# Patient Record
Sex: MALE | Race: WHITE
[De-identification: names, ages, dates, MRNs, and addresses within clinical notes are randomized per-mention and may not be internally consistent; named-entity substitution may affect disease eponyms.]

---

## 2017-01-05 ENCOUNTER — HOSPITAL ENCOUNTER (OUTPATIENT)
Dept: HOSPITAL 45 - C.LAB1850 | Age: 47
Discharge: HOME | End: 2017-01-05
Attending: INTERNAL MEDICINE
Payer: COMMERCIAL

## 2017-01-05 DIAGNOSIS — M31.0: Primary | ICD-10-CM

## 2017-01-05 LAB
ALBUMIN/GLOB SERPL: 1 {RATIO} (ref 0.9–2)
ALP SERPL-CCNC: 95 U/L (ref 45–117)
ALT SERPL-CCNC: 55 U/L (ref 12–78)
ANION GAP SERPL CALC-SCNC: 8 MMOL/L (ref 3–11)
AST SERPL-CCNC: 52 U/L (ref 15–37)
BASOPHILS # BLD: 0.02 K/UL (ref 0–0.2)
BASOPHILS NFR BLD: 0.3 %
BUN SERPL-MCNC: 22 MG/DL (ref 7–18)
BUN/CREAT SERPL: 23.1 (ref 10–20)
CALCIUM SERPL-MCNC: 8.8 MG/DL (ref 8.5–10.1)
CHLORIDE SERPL-SCNC: 108 MMOL/L (ref 98–107)
CO2 SERPL-SCNC: 28 MMOL/L (ref 21–32)
COMPLETE: YES
CREAT SERPL-MCNC: 0.96 MG/DL (ref 0.6–1.4)
EOSINOPHIL NFR BLD AUTO: 248 K/UL (ref 130–400)
GLOBULIN SER-MCNC: 3.4 GM/DL (ref 2.5–4)
GLUCOSE SERPL-MCNC: 89 MG/DL (ref 70–99)
HCT VFR BLD CALC: 42.7 % (ref 42–52)
IG%: 0.1 %
IMM GRANULOCYTES NFR BLD AUTO: 30.4 %
LYMPHOCYTES # BLD: 2.34 K/UL (ref 1.2–3.4)
MCH RBC QN AUTO: 29.4 PG (ref 25–34)
MCHC RBC AUTO-ENTMCNC: 33.3 G/DL (ref 32–36)
MCV RBC AUTO: 88.4 FL (ref 80–100)
MONOCYTES NFR BLD: 6.2 %
NEUTROPHILS # BLD AUTO: 2.5 %
NEUTROPHILS NFR BLD AUTO: 60.5 %
PMV BLD AUTO: 10.5 FL (ref 7.4–10.4)
POTASSIUM SERPL-SCNC: 4.2 MMOL/L (ref 3.5–5.1)
RBC # BLD AUTO: 4.83 M/UL (ref 4.7–6.1)
SODIUM SERPL-SCNC: 144 MMOL/L (ref 136–145)
WBC # BLD AUTO: 7.71 K/UL (ref 4.8–10.8)

## 2017-04-03 ENCOUNTER — HOSPITAL ENCOUNTER (OUTPATIENT)
Dept: HOSPITAL 45 - C.LAB | Age: 47
Discharge: HOME | End: 2017-04-03
Attending: INTERNAL MEDICINE
Payer: COMMERCIAL

## 2017-04-03 DIAGNOSIS — M31.0: Primary | ICD-10-CM

## 2017-04-03 LAB
ALT SERPL-CCNC: 36 U/L (ref 12–78)
AST SERPL-CCNC: 22 U/L (ref 15–37)

## 2017-08-07 ENCOUNTER — HOSPITAL ENCOUNTER (INPATIENT)
Dept: HOSPITAL 45 - C.EDB | Age: 47
LOS: 3 days | Discharge: HOME | DRG: 274 | End: 2017-08-10
Attending: HOSPITALIST | Admitting: HOSPITALIST
Payer: COMMERCIAL

## 2017-08-07 ENCOUNTER — HOSPITAL ENCOUNTER (OUTPATIENT)
Dept: HOSPITAL 45 - C.LAB1850 | Age: 47
Discharge: HOME | End: 2017-08-07
Attending: INTERNAL MEDICINE
Payer: COMMERCIAL

## 2017-08-07 VITALS — DIASTOLIC BLOOD PRESSURE: 72 MMHG | SYSTOLIC BLOOD PRESSURE: 128 MMHG | HEART RATE: 150 BPM

## 2017-08-07 VITALS — HEART RATE: 120 BPM | DIASTOLIC BLOOD PRESSURE: 81 MMHG | SYSTOLIC BLOOD PRESSURE: 126 MMHG

## 2017-08-07 VITALS
WEIGHT: 251.11 LBS | BODY MASS INDEX: 35.15 KG/M2 | HEIGHT: 71 IN | BODY MASS INDEX: 35.15 KG/M2 | WEIGHT: 251.11 LBS | HEIGHT: 71 IN

## 2017-08-07 VITALS — HEART RATE: 95 BPM | SYSTOLIC BLOOD PRESSURE: 92 MMHG | DIASTOLIC BLOOD PRESSURE: 55 MMHG

## 2017-08-07 VITALS
TEMPERATURE: 98.24 F | OXYGEN SATURATION: 98 % | DIASTOLIC BLOOD PRESSURE: 78 MMHG | SYSTOLIC BLOOD PRESSURE: 137 MMHG | HEART RATE: 76 BPM

## 2017-08-07 DIAGNOSIS — F41.9: ICD-10-CM

## 2017-08-07 DIAGNOSIS — Z79.82: ICD-10-CM

## 2017-08-07 DIAGNOSIS — E66.9: ICD-10-CM

## 2017-08-07 DIAGNOSIS — I10: ICD-10-CM

## 2017-08-07 DIAGNOSIS — M10.9: ICD-10-CM

## 2017-08-07 DIAGNOSIS — Z79.01: ICD-10-CM

## 2017-08-07 DIAGNOSIS — Z95.2: ICD-10-CM

## 2017-08-07 DIAGNOSIS — I47.1: Primary | ICD-10-CM

## 2017-08-07 DIAGNOSIS — Z87.891: ICD-10-CM

## 2017-08-07 DIAGNOSIS — E78.5: ICD-10-CM

## 2017-08-07 DIAGNOSIS — G25.81: ICD-10-CM

## 2017-08-07 DIAGNOSIS — F32.9: ICD-10-CM

## 2017-08-07 DIAGNOSIS — M31.0: Primary | ICD-10-CM

## 2017-08-07 DIAGNOSIS — Z81.8: ICD-10-CM

## 2017-08-07 DIAGNOSIS — R21: ICD-10-CM

## 2017-08-07 DIAGNOSIS — Z79.899: ICD-10-CM

## 2017-08-07 DIAGNOSIS — G47.33: ICD-10-CM

## 2017-08-07 DIAGNOSIS — Z82.49: ICD-10-CM

## 2017-08-07 LAB
ALBUMIN/GLOB SERPL: 1.1 {RATIO} (ref 0.9–2)
ALBUMIN/GLOB SERPL: 1.1 {RATIO} (ref 0.9–2)
ALP SERPL-CCNC: 108 U/L (ref 45–117)
ALP SERPL-CCNC: 96 U/L (ref 45–117)
ALT SERPL-CCNC: 34 U/L (ref 12–78)
ALT SERPL-CCNC: 34 U/L (ref 12–78)
ANION GAP SERPL CALC-SCNC: 10 MMOL/L (ref 3–11)
ANION GAP SERPL CALC-SCNC: 8 MMOL/L (ref 3–11)
APPEARANCE UR: CLEAR
AST SERPL-CCNC: 25 U/L (ref 15–37)
AST SERPL-CCNC: 26 U/L (ref 15–37)
BASOPHILS # BLD: 0.01 K/UL (ref 0–0.2)
BASOPHILS # BLD: 0.02 K/UL (ref 0–0.2)
BASOPHILS NFR BLD: 0.1 %
BASOPHILS NFR BLD: 0.2 %
BENZODIAZ UR-MCNC: (no result) UG/L
BILIRUB UR-MCNC: (no result) MG/DL
BUN SERPL-MCNC: 18 MG/DL (ref 7–18)
BUN SERPL-MCNC: 19 MG/DL (ref 7–18)
BUN/CREAT SERPL: 16 (ref 10–20)
BUN/CREAT SERPL: 16.1 (ref 10–20)
CALCIUM SERPL-MCNC: 8.9 MG/DL (ref 8.5–10.1)
CALCIUM SERPL-MCNC: 9.1 MG/DL (ref 8.5–10.1)
CHLORIDE SERPL-SCNC: 107 MMOL/L (ref 98–107)
CHLORIDE SERPL-SCNC: 108 MMOL/L (ref 98–107)
CHOLEST/HDLC SERPL: 4.5 {RATIO}
CKMB/CK RATIO: 1.5 (ref 0–3)
CKMB/CK RATIO: 1.8 (ref 0–3)
CO2 SERPL-SCNC: 25 MMOL/L (ref 21–32)
CO2 SERPL-SCNC: 25 MMOL/L (ref 21–32)
COLOR UR: YELLOW
COMPLETE: YES
COMPLETE: YES
CREAT CL PREDICTED SERPL C-G-VRATE: 109.3 ML/MIN
CREAT SERPL-MCNC: 1.1 MG/DL (ref 0.6–1.4)
CREAT SERPL-MCNC: 1.2 MG/DL (ref 0.6–1.4)
EOSINOPHIL NFR BLD AUTO: 251 K/UL (ref 130–400)
EOSINOPHIL NFR BLD AUTO: 270 K/UL (ref 130–400)
GLOBULIN SER-MCNC: 3.5 GM/DL (ref 2.5–4)
GLOBULIN SER-MCNC: 3.6 GM/DL (ref 2.5–4)
GLUCOSE SERPL-MCNC: 108 MG/DL (ref 70–99)
GLUCOSE SERPL-MCNC: 86 MG/DL (ref 70–99)
GLUCOSE UR QL: 42 MG/DL
HCT VFR BLD CALC: 46 % (ref 42–52)
HCT VFR BLD CALC: 46.3 % (ref 42–52)
IG%: 0.1 %
IG%: 0.2 %
IMM GRANULOCYTES NFR BLD AUTO: 27.3 %
IMM GRANULOCYTES NFR BLD AUTO: 27.6 %
INR PPP: 2.1 (ref 0.9–1.1)
KETONES UR QL STRIP: 90 MG/DL
LYME DISEASE AB IGG: (no result)
LYME DISEASE AB IGM: (no result)
LYMPHOCYTES # BLD: 2.47 K/UL (ref 1.2–3.4)
LYMPHOCYTES # BLD: 2.49 K/UL (ref 1.2–3.4)
MAGNESIUM SERPL-MCNC: 2.1 MG/DL (ref 1.8–2.4)
MANUAL MICROSCOPIC REQUIRED?: NO
MCH RBC QN AUTO: 30.1 PG (ref 25–34)
MCH RBC QN AUTO: 30.5 PG (ref 25–34)
MCHC RBC AUTO-ENTMCNC: 34.1 G/DL (ref 32–36)
MCHC RBC AUTO-ENTMCNC: 34.6 G/DL (ref 32–36)
MCV RBC AUTO: 88.2 FL (ref 80–100)
MCV RBC AUTO: 88.3 FL (ref 80–100)
MONOCYTES NFR BLD: 7.2 %
MONOCYTES NFR BLD: 7.3 %
NEUTROPHILS # BLD AUTO: 1.3 %
NEUTROPHILS # BLD AUTO: 1.5 %
NEUTROPHILS NFR BLD AUTO: 63.3 %
NEUTROPHILS NFR BLD AUTO: 63.9 %
NITRITE UR QL STRIP: (no result)
NITRITE UR QL STRIP: 276 MG/DL (ref 0–150)
PARTIAL THROMBOPLASTIN RATIO: 1.4
PCP UR-MCNC: (no result) UG/L
PH UR STRIP: 5 [PH] (ref 4.5–7.5)
PH UR: 187 MG/DL (ref 0–200)
PMV BLD AUTO: 10.7 FL (ref 7.4–10.4)
PMV BLD AUTO: 10.7 FL (ref 7.4–10.4)
POTASSIUM SERPL-SCNC: 3.9 MMOL/L (ref 3.5–5.1)
POTASSIUM SERPL-SCNC: 4.1 MMOL/L (ref 3.5–5.1)
PROTHROMBIN TIME: 23 SECONDS (ref 9–12)
RBC # BLD AUTO: 5.21 M/UL (ref 4.7–6.1)
RBC # BLD AUTO: 5.25 M/UL (ref 4.7–6.1)
REVIEW REQ?: NO
SODIUM SERPL-SCNC: 141 MMOL/L (ref 136–145)
SODIUM SERPL-SCNC: 142 MMOL/L (ref 136–145)
SP GR UR STRIP: 1.02 (ref 1–1.03)
TSH SERPL-ACNC: 1.79 UIU/ML (ref 0.3–4.5)
URINE BILL WITH OR WITHOUT MIC: (no result)
URINE EPITHELIAL CELL AUTO: (no result) /LPF (ref 0–5)
UROBILINOGEN UR-MCNC: (no result) MG/DL
VERY LOW DENSITY LIPOPROT CALC: 55 MG/DL
WBC # BLD AUTO: 8.95 K/UL (ref 4.8–10.8)
WBC # BLD AUTO: 9.12 K/UL (ref 4.8–10.8)
ZZUR CULT IF INDIC CLEAN CATCH: NO

## 2017-08-07 RX ADMIN — BUPROPION HYDROCHLORIDE SCH MG: 150 TABLET, EXTENDED RELEASE ORAL at 20:56

## 2017-08-07 RX ADMIN — Medication SCH MG: at 21:51

## 2017-08-07 RX ADMIN — DILTIAZEM HYDROCHLORIDE PRN MLS/HR: 5 INJECTION INTRAVENOUS at 21:02

## 2017-08-07 RX ADMIN — ESCITALOPRAM OXALATE SCH MG: 20 TABLET, FILM COATED ORAL at 20:55

## 2017-08-07 RX ADMIN — SIMVASTATIN SCH MG: 40 TABLET, FILM COATED ORAL at 20:56

## 2017-08-07 RX ADMIN — ROPINIROLE HYDROCHLORIDE SCH MG: 0.25 TABLET, FILM COATED ORAL at 20:58

## 2017-08-07 RX ADMIN — VANCOMYCIN HYDROCHLORIDE SCH MG: 1 INJECTION, POWDER, LYOPHILIZED, FOR SOLUTION INTRAVENOUS at 20:52

## 2017-08-07 RX ADMIN — DILTIAZEM HYDROCHLORIDE PRN MLS/HR: 5 INJECTION INTRAVENOUS at 19:11

## 2017-08-07 RX ADMIN — Medication SCH ML: at 20:53

## 2017-08-07 NOTE — HISTORY AND PHYSICAL
History & Physical


Date & Time of Service:


Aug 7, 2017 at 18:18


Chief Complaint:


Heart Fluttering/Skipping/Racing/Headache/Pulse


Primary Care Physician:


Augusto Claire M.D.


History of Present Illness


Source:  patient, clinic records, hospital records


This is a 48 y/o male with a history of mechanical AVR in March 2012, HTN, HLD, 

gout, depression and anxiety, RLS, CELINE, and h/o recurrent C. diff who presented 

to the ED on 8/7 with palpitations, dizziness and shortness of breath.  The 

patient states he has a history of arrhythmia with similar symptoms since high 

school.  The arrhythmia had seemed to resolve in college and did not recur 

again until a few years ago following his AVR.  The patient states if he 

develops palpitations, it is usually very short lived.  A few weeks ago the 

patient had developed palpitations and had sustained tachycardia for a few 

hours before eventually resolving.  Yesterday, the patient again became 

tachycardic with his HR consistently in the 150s and associated palpitations, 

shortness of breath and dizziness.  This time his symptoms have persisted for 

over a day, prompting him to come to the ER as this does not usually last so 

long.  The patient denies fevers, chills, sweats, chest pain, claudication, 

cough, wheezing, nausea, vomiting, abdominal pain, dysuria, hematuria, urinary 

retention, paralysis, weakness, numbness and tingling.





Past Medical/Surgical History


Medical Problems:


(1) C. difficile colitis


Status: Recurrent, on chronic vancomycin





Surgical Problems:


(1) H/O aortic valve replacement


Status: Resolved  





HTN





HLD





Gout





Depression with anxiety





RLS





CELINE








Family History





Bipolar disorder


Coronary artery disease


Myocardial infarction


Schizophrenia





Social History


Smoking Status:  Former Smoker (quit 1 year ago)


Smokeless Tobacco Use:  No


Alcohol Use:  socially (beer on weekends)


Drug Use:  none


Marital Status:  in relationship


Housing status:  lives with significant other (lives with partner)


Occupational Status:  employed





Immunizations


History of Influenza Vaccine:  Yes


Influenza Vaccine Date:  Oct 6, 2011


History of Tetanus Vaccine?:  Unknown


History of Pneumococcal:  No


History of Hepatitis B Vaccine:  Unknown





Allergies


Coded Allergies:  


     No Known Allergies (Unverified , 8/7/17)





Home Medications


Scheduled


Amoxicillin (Amoxil), 2,000 MG PO UD


Aspirin (Aspirin Ec), 81 MG PO QAM


Bupropion (Wellbutrin Sr), 150 MG PO BID


Escitalopram Oxalate (Lexapro), 20 MG PO HS


Febuxostat (Uloric), 40 MG PO QAM


Metoprolol Tartrate (Lopressor) (Lopressor), 150 MG PO QPM


Multiple Vitamin (Multivitamin), 1 TAB PO QAM


Probiotic Product (Align), 4 MG PO HS


Ropinirole (Requip), 0.5 MG PO HS


Simvastatin (Zocor), 40 MG PO QPM


Vancomycin Hcl (Vancomycin), 125 MG PO QPM


Warfarin Sod (Jantoven), 5 MG PO DAILY


Warfarin Sod (Jantoven), 2 MG PO DAILY


Zolpidem Tartrate (Ambien), 10 MG PO HS





Scheduled PRN


Lorazepam (Ativan *), 0.5 MG PO TID PRN





Review of Systems


Constitutional:  No fever, No chills, No sweats, No weakness, No fatigue


Eyes:  No worsening of vision, No discharge, No diplopia


ENT:  No hearing loss, No sore throat, No trouble swallowing


Respiratory:  + shortness of breath, No cough, No wheezing


Cardiovascular:  + palpitations, No chest pain, No claudication


Abdomen:  No pain, No nausea, No vomiting


Musculoskeletal:  No joint pain, No muscle pain, No swelling


Genitourinary - Male:  No hematuria, No dysuria, No urinary retention


Neurologic:  No paralysis, No weakness, No numbness/tingling


Integumentary:  No rash, No itch, No color change





Physical Exam


Vital Signs











  Date Time  Temp Pulse Resp B/P (MAP) Pulse Ox O2 Delivery O2 Flow Rate FiO2


 


8/7/17 17:21  132 19 123/91 96 Room Air  


 


8/7/17 16:44  116 32 134/80 97   


 


8/7/17 16:12  111 24 128/97 98 Room Air  


 


8/7/17 15:58  128 23 132/96 98 Room Air  


 


8/7/17 15:58  104  132/96    


 


8/7/17 15:32  136 17 112/96 98 Room Air  


 


8/7/17 15:31     98 Room Air  


 


8/7/17 15:21  134  154/118    


 


8/7/17 15:14  133      


 


8/7/17 14:48     96 Room Air  


 


8/7/17 14:45 36.7     Room Air  








General appearance:  +Obese.  Well-developed, well-nourished, no apparent 

distress


Head:  Normocephalic, atraumatic


Eyes:  Normal inspection, PERRL, EOMI


ENT:  Normal ENT inspection, hearing grossly normal, pharynx normal


Neck:  Supple, no JVD, trachea midline


Respiratory/Chest:  Lungs clear to auscultation, normal breath sounds, no 

respiratory distress


Cardiovascular:  +Tachycardic.  Regular rhythm, no gallop, no murmur


Abdomen/GI:  Normal bowel sounds, non-tender, soft


Extremities/Musculoskeletal:  Normal inspection, no calf tenderness, no pedal 

edema


Neurological/Psych:  Alert, normal mood/affect, oriented x 3


Skin:  Normal color, warm/dry, no rash





Diagnostics


Laboratory Results





Results Past 24 Hours








Test


  8/7/17


15:25 8/7/17


15:38 8/7/17


17:50 Range/Units


 


 


White Blood Count 9.12   4.8-10.8  K/uL


 


Red Blood Count 5.21   4.7-6.1  M/uL


 


Hemoglobin 15.9   14.0-18.0  g/dL


 


Hematocrit 46.0   42-52  %


 


Mean Corpuscular Volume 88.3     fL


 


Mean Corpuscular Hemoglobin 30.5   25-34  pg


 


Mean Corpuscular Hemoglobin


Concent 34.6


  


  


  32-36  g/dl


 


 


Platelet Count 251   130-400  K/uL


 


Mean Platelet Volume 10.7   7.4-10.4  fL


 


Neutrophils (%) (Auto) 63.9    %


 


Lymphocytes (%) (Auto) 27.3    %


 


Monocytes (%) (Auto) 7.2    %


 


Eosinophils (%) (Auto) 1.3    %


 


Basophils (%) (Auto) 0.2    %


 


Neutrophils # (Auto) 5.82   1.4-6.5  K/uL


 


Lymphocytes # (Auto) 2.49   1.2-3.4  K/uL


 


Monocytes # (Auto) 0.66   0.11-0.59  K/uL


 


Eosinophils # (Auto) 0.12   0-0.5  K/uL


 


Basophils # (Auto) 0.02   0-0.2  K/uL


 


RDW Standard Deviation 44.2   36.4-46.3  fL


 


RDW Coefficient of Variation 13.6   11.5-14.5  %


 


Immature Granulocyte % (Auto) 0.1    %


 


Immature Granulocyte # (Auto) 0.01   0.00-0.02  K/uL


 


Prothrombin Time


  23.0


  


  


  9.0-12.0


SECONDS


 


Prothromb Time International


Ratio 2.1


  


  


  0.9-1.1  


 


 


Activated Partial


Thromboplast Time 36.9


  


  


  21.0-31.0


SECONDS


 


Partial Thromboplastin Ratio 1.4    


 


Sodium Level 142   136-145  mmol/L


 


Potassium Level 3.9   3.5-5.1  mmol/L


 


Chloride Level 107     mmol/L


 


Carbon Dioxide Level 25   21-32  mmol/L


 


Anion Gap 10.0   3-11  mmol/L


 


Blood Urea Nitrogen 18   7-18  mg/dl


 


Creatinine


  1.10


  


  


  0.60-1.40


mg/dl


 


Est Creatinine Clear Calc


Drug Dose 109.3


  


  


   ml/min


 


 


Estimated GFR (


American) 93.5


  


  


   


 


 


Estimated GFR (Non-


American 80.6


  


  


   


 


 


BUN/Creatinine Ratio 16.1   10-20  


 


Random Glucose 86   70-99  mg/dl


 


Calcium Level 8.9   8.5-10.1  mg/dl


 


Magnesium Level 2.1   1.8-2.4  mg/dl


 


Total Bilirubin 1.0   0.2-1  mg/dl


 


Aspartate Amino Transf


(AST/SGOT) 26


  


  


  15-37  U/L


 


 


Alanine Aminotransferase


(ALT/SGPT) 34


  


  


  12-78  U/L


 


 


Alkaline Phosphatase 108     U/L


 


Total Creatine Kinase 94     U/L


 


Creatine Kinase MB 1.7   0.5-3.6  ng/ml


 


Creatine Kinase MB Ratio 1.8   0-3.0  


 


Total Protein 7.4   6.4-8.2  gm/dl


 


Albumin 3.8   3.4-5.0  gm/dl


 


Globulin 3.6   2.5-4.0  gm/dl


 


Albumin/Globulin Ratio 1.1   0.9-2  


 


Thyroid Stimulating Hormone


(TSH) 1.790


  


  


  0.300-4.500


uIu/ml


 


Lyme Disease IgG Antibody NEG   NEG  


 


Lyme Disease IgM Antibody NEG   NEG  


 


Bedside Troponin I  < 0.030  0-0.045  ng/ml











Diagnostic Radiology


Reviewed the following studies and agree with interpretation as follows:








                                                                               

                                                                 


Patient Name: BALDO ALMAZAN                               Unit Number:  

[id number]                  


 








 











Dictated: 08/07/17 1648


 


Transcribed: 08/07/17 1648


 


MS


 


Printed Date/Time: [~ rep prt dt]/[~ rep prt tm]








 [~ rep ct labl] - [~ rep ct ivnm]


 





   [facility]


 Radiology Department


 [place] [address]


 [phone]





 











Dictated: 08/07/17 1648


 


Transcribed: 08/07/17 1648


 


MS


 


Printed Date/Time: [~ rep prt dt]/[~ rep prt tm]








 [~ rep ct labl] - [~ rep ct ivnm]


 





 











Patient:  BALDO ALMAZAN Address1:  [address] Rec:  [id number] Address2:  


 


Acct ID:  [id number] [facility] Zip:  [place] [address]


 


YOB: 1970          Sex:  M Room/Bed:  


 


Ref Phy:  Augusto Claire M.D. SC: KELLI


 


Att Phy:   Report #:  [id number]


 


Rox Phy:  Augusto Claire M.D. Test:  CXR1P


 


Admit Phy:   Technician:  MARICEL


 


Interpreting Phy:  Loyd Torres M.D. Diagnosis:  HEART FLUTTERING/SKIPPING/

RACING/HEADACHE/PULSE


 


Ordering Phy:  Rj Rowe PA-C Service Date:  08/07/17


 


Admit Date: 08/07/17 MNE: [id number]


 


 CONF:


 


 DICTATED BY: Loyd Torres M.D.]]


 


CC: Rj Rowe PA-C Flickinger, Bridget B., M.D. Pro, Jeffrey W., M.D.


 


 Endcc:











[~ rep ct add3]]


CHEST ONE VIEW PORTABLE





CLINICAL HISTORY: Dyspnea, tachycardia. Mechanical heart valve    





COMPARISON STUDY:  3/6/2015





FINDINGS: Mild stable cardiomegaly. Prior median sternotomy. Lungs are clear.


Diaphragms are smooth. 





IMPRESSION:  Chronic and postoperative change. No acute process. 

















The above report was generated using voice recognition software.  It may contain


grammatical, syntax or spelling errors.














Electronically signed by:  Loyd Torres M.D.


8/7/2017 4:49 PM





Dictated Date/Time:  8/7/2017 4:48 PM





 The status of this report is Signed.   


 Draft = Not yet reviewed or approved by Radiologist.  


 Signed = Reviewed and approved by Radiologist.


<AttendingPhy></AttendingPhy> <FamilyPhy>Augusto Claire M.D.</FamilyPhy> <

PrimaryPhy>Pro,Augusto W., M.D.</PrimaryPhy> <UnitNumber>[id number]</UnitNumber

> <VisitNumber>[id number]</VisitNumber> <PatientName>BALDO ALMAZAN</

PatientName> <DateOfBirth>1970</DateOfBirth> <Location>[facility]</Location> <

ServiceDate>08/07/17</ServiceDate> <MNE>ESINDI</MNE> <OrderingPhy>Rj Rowe PA-C</OrderingPhy> <OrderingPhyMNE>f rep ord dr rizo</OrderingPhyMNE> <

DictatingPhyMNE>f rep dict dr rizo</DictatingPhyMNE> <CCListMNE>f rep ct mne</

CCListMNE> <AdmittingPhyMNE>f pt admit dr rizo</AdmittingPhyMNE> <AttendingPhyMNE

>f pt attend dr rizo</AttendingPhyMNE>


<ConsultingPhyMNE>f pt consult dr rizo</ConsultingPhyMNE> <FamilyPhyMNE>f pt fam 

dr rizo</FamilyPhyMNE> <OtherPhyMNE>f pt other dr rizo</OtherPhyMNE> <

PrimaryPhyMNE>f pt prim care dr rizo</PrimaryPhyMNE> <ReferringPhyMNE>f pt 

referring dr rizo</ReferringPhyMNE>





EKG


Reviewed EKG and agree with interpretation as follows:





125 bpm, ectopic atrial tachycardia





Impression


Assessment and Plan


48 y/o male with a history of mechanical AVR in March 2012, HTN, HLD, gout, 

depression and anxiety, RLS, CELINE, and h/o recurrent C. diff who presented to 

the ED on 8/7 with palpitations, dizziness and shortness of breath.  Pt arrived 

with heart rate in the 130s-150s but other vital signs stable.  Pt received 

Lopressor 5 mg IV x 2 doses in ED which did not help to bring rate down.  CXR 

shows no acute disease.  EKG shows ectopic atrial tachycardia.  Labs grossly 

unremarkable.  Pt seen by Dr. Kocher in the ED.





Atrial tachycardia


-Admit to telemetry for cardiac monitoring


-Cardiology consulted, appreciate recs. Spoke with Dr. Kocher, recommends 

starting diltiazem drip for now to see if that can control rate.  If not, could 

consider anti-arrhythmics, but pt may not be a good candidate.  Could also 

consider ablation if pt is agreeable and rate not controlled with diltiazem.


-Start diltiazem drip


-NPO after midnight for possible ablation


-Trend cardiac enzymes q8h x3.  First set negative


-EKG q am and prn with chest pain


-Potassium, magnesium, and TSH all WNL





Mechanical AVR on chronic anticoagulation


-INR 2.1 on admission


-Okay to continue warfarin per cardio


-Continue warfarin 7 mg PO qd


-Continue to monitor PT/INR





HTN--stable


-Continue Lopressor 150 mg PO qpm





HLD


-Continue simvastatin 40 mg PO qd





Gout


-Continue Uloric 40 mg PO qd





Depression and anxiety


-Continue bupropion 150 mg PO BID, escitalopram 20 mg PO qd, and Ativan 0.5 mg 

PO TID prn anxiety





RLS


-Continue Requip 0.5 mg PO qhs





CELINE


-Set up CPAP





H/o recurrent C. 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 

chronic vanc per infectious disease


-Contact precautions


-Continue vancomycin 125 mg PO qd





DVT prophylaxis


-Warfarin


-KLAUDIA hose and SCDs





Code Status


-Level I, FULL RESUSCITATION STATUS





Level of Care


Med/Surg





Resuscitation Status


FULL RESUSCITATION





VTE Prophylaxis


VTE Risk Assessment Done? Y/N:  Yes


Risk Level:  Moderate


Given or contraindicated:  Warfarin (Coumadin), T.E.D. Stockings, SCD's





Note


Attending Attestation:


Pt seen/examined, chart reviewed, care plan d/w MIGUEL Wolfe.


I agree w/ the key components of her documentation. 





Pleasant 48yo male with h/o AVR on chronic coumadin (due to bicuspid AV) and 

long-standing palpitations/dysrhythmia along with chronic c. diff colonization 

presenting with worsening palpitations and tachycardia.  Upon ER presentation 

found to have narrow complex tachycardia most c/w atrial tachycardia.  Seen by 

Dr. Christopher Kocher, cardiology, who recommended cardizem infusion in 

addition to his BB. 


During my assessment he denied any chest pain, dyspnea or other CV symptoms. 





PMH, PSH, allergies, meds, sochx, famhx, ros - reviewed





VSS although he has remained tachy


o2 sats & BP wnl


gen - obese, NAD


neck - no JVD


heart - irregular, tachy, s1, s2, crisp mechanical sound heard, no murmur


lungs - CTA b/l 


abd - soft


ext - no edema





TSH, K, mag, troponin - all normal 


EKG - atrial tach





A/P:


atrial tach - appreciate cardiology consultation. 


cardizem infusion.


continue oral beta blocker. 


NPO after MN in the event he needs procedure. 





AVR - INR 2.1, goal is 2.5-3.5.  


If INR tomorrow is not therapeutic consider coumadin dose increase temporarily.

  





chronic c. diff - cont vanco daily; contact isolation. 





other plans per Ms. Aiden.





Asher Franklin MD





Additional Copies To


Kocher, Christopher W., MD; Pro,Augusto GONZALEZ M.D.

## 2017-08-07 NOTE — EMERGENCY ROOM VISIT NOTE
ED Visit Note


First contact with patient:  14:55


I have personally seen and evaluated the patient with the PA.  I agree with the 

diagnosis and management decisions and have been personally involved in the 

case.  Patient was evaluated and felt to have a tachycardia, possibly a second 

degree AV block.  IV metoprolol was given 2 without much meaningful rate 

control.  Cardiology was consulted.  Dr. Kocher evaluated the patient bedside 

and has recommended IV Cardizem drip.  Please see Rj Rowe PA-C's notes for 

further details of the history, physical and visit.

## 2017-08-07 NOTE — DIAGNOSTIC IMAGING REPORT
CHEST ONE VIEW PORTABLE



CLINICAL HISTORY: Dyspnea, tachycardia. Mechanical heart valve    



COMPARISON STUDY:  3/6/2015



FINDINGS: Mild stable cardiomegaly. Prior median sternotomy. Lungs are clear.

Diaphragms are smooth. 



IMPRESSION:  Chronic and postoperative change. No acute process. 











The above report was generated using voice recognition software.  It may contain

grammatical, syntax or spelling errors.









Electronically signed by:  Loyd Torres M.D.

8/7/2017 4:49 PM



Dictated Date/Time:  8/7/2017 4:48 PM

## 2017-08-07 NOTE — CARDIOLOGY CONSULTATION
Cardiology Consultation


Date of Consultation:


Aug 7, 2017.


Requesting Physician:


Rigoberto


Reason for Consultation:


tachycardia


History of Present Illness


The patient is a 47-year-old gentleman with a history of aortic valve disease 

and mechanical aortic valve replacement he has been experiencing symptoms of 

tachycardia and palpitations.  Patient states that for the past 2 days he has 

had fairly extended episodes of palpitations.  These have been associated with 

some symptoms of mild dizziness.  He has not had any symptoms of chest 

discomfort or significant breathing difficulty except with exertion.  When he 

is active he has some mild dyspnea.  He has not suffered syncope.  He has had 

similar symptoms in the past but generally resolve without any particular 

intervention.  Patient can report episodes of tachycardia and palpitations 

dating back several years.  These episodes generally are limited in duration to 

several hours.  He has few symptoms associated with the tachycardia except as 

mentioned above.  He has been more concerned lately as the episodes have been 

more frequent and of longer duration.  Because the episode currently did not 

resolve he felt an evaluation was warranted.





At the time of this interview the patient claims to be feeling well.  He still 

has a sensation of palpitations and racing heartbeat.  He has no symptoms of 

chest discomfort and no breathing difficulty currently.  He has been 

maintaining his usual level of activity.  He does not perform routine exercise, 

but can engage in regular activity without limitation.  He has no symptoms of 

chest discomfort or exertional dyspnea usually.  He has been compliant with his 

medical therapy.





Past Medical/Surgical History


Bicuspid aortic valve


Colonized with Clostridium difficile


Depression


Hyperlipidemia


Gout


Hypertension


Leukocytoclastic vasculitis


Obstructive sleep apnea


Restless leg syndrome


Renal mass status post partial nephrectomy





Past surgical history





Right partial nephrectomy


Mechanical AVR 2012 [facility]





Family History





No pertinent family history


No premature coronary disease





Social History


Smoking Status:  Former Smoker


History of Alcohol Use:  Yes (weekend)





Review of Systems


Constitutional:  + see HPI


Respiratory:  + see HPI


Cardiac:  + see HPI


Abdomen:  + see HPI


Male :  + see HPI


Neurologic:  + see HPI


Heme:  + see HPI


Endo:  + see HPI


Skin:  + see HPI


All Other Systems:  Reviewed and Negative





Allergies


Coded Allergies:  


     No Known Allergies (Unverified , 8/7/17)





Medications


Aspirin 81 milligrams daily


Warfarin


Bupropion


Cyclobenzaprine


Metoprolol


Ropinarole


Simvastatin


Uloric


Vancomycin





Physical Exam





Vital Signs Past 12 Hours








  Date Time  Temp Pulse Resp B/P (MAP) Pulse Ox O2 Delivery O2 Flow Rate FiO2


 


8/7/17 17:21  132 19 123/91 96 Room Air  


 


8/7/17 16:44  116 32 134/80 97   


 


8/7/17 16:12  111 24 128/97 98 Room Air  


 


8/7/17 15:58  128 23 132/96 98 Room Air  


 


8/7/17 15:58  104  132/96    


 


8/7/17 15:32  136 17 112/96 98 Room Air  


 


8/7/17 15:31     98 Room Air  


 


8/7/17 15:21  134  154/118    


 


8/7/17 15:14  133      


 


8/7/17 14:48     96 Room Air  


 


8/7/17 14:45 36.7     Room Air  








The patient is alert and oriented. Mood and affect appeared normal. He answered 

all questions appropriately.


HEENT:  Pupils are equal and reactive to light and accommodation.  Extraocular 

movements are intact. The sclerae are anicteric.


Neuro:  Cranial nerves intact


Neck:  Patient's neck is supple.  He has palpable carotid pulses bilaterally 

without bruits on auscultation.  There is no evidence of jugular venous 

distention. The thyroid is not enlarged. 


Lungs:  Clear to auscultation bilaterally.  He has good air movement without 

use of accessory muscles. No rales wheezes or rhonchi.


Chest:  Keel a type scar along the sternum and epigastrium


Cardiac:  Heart demonstrates a regular rate and rhythm some ectopy and 

tachycardia.  Normal S1 and mechanical S2. No murmurs on examination.


Pulses:  The patient has palpable radial pulses bilaterally that are equal in 

intensity


Extremities:  There was no evidence of hypoperfusion.  There is no cyanosis or 

clubbing.  There is no edema.


Skin:  I did not appreciate any rashes on examination today.





Data


Laboratory Results:





Last 24 Hours








Test


  8/7/17


15:25 8/7/17


15:38


 


White Blood Count 9.12 K/uL  


 


Red Blood Count 5.21 M/uL  


 


Hemoglobin 15.9 g/dL  


 


Hematocrit 46.0 %  


 


Mean Corpuscular Volume 88.3 fL  


 


Mean Corpuscular Hemoglobin 30.5 pg  


 


Mean Corpuscular Hemoglobin


Concent 34.6 g/dl 


  


 


 


Platelet Count 251 K/uL  


 


Mean Platelet Volume 10.7 fL  


 


Neutrophils (%) (Auto) 63.9 %  


 


Lymphocytes (%) (Auto) 27.3 %  


 


Monocytes (%) (Auto) 7.2 %  


 


Eosinophils (%) (Auto) 1.3 %  


 


Basophils (%) (Auto) 0.2 %  


 


Neutrophils # (Auto) 5.82 K/uL  


 


Lymphocytes # (Auto) 2.49 K/uL  


 


Monocytes # (Auto) 0.66 K/uL  


 


Eosinophils # (Auto) 0.12 K/uL  


 


Basophils # (Auto) 0.02 K/uL  


 


RDW Standard Deviation 44.2 fL  


 


RDW Coefficient of Variation 13.6 %  


 


Immature Granulocyte % (Auto) 0.1 %  


 


Immature Granulocyte # (Auto) 0.01 K/uL  


 


Prothrombin Time 23.0 SECONDS  


 


Prothromb Time International


Ratio 2.1 


  


 


 


Activated Partial


Thromboplast Time 36.9 SECONDS 


  


 


 


Partial Thromboplastin Ratio 1.4  


 


Sodium Level 142 mmol/L  


 


Potassium Level 3.9 mmol/L  


 


Chloride Level 107 mmol/L  


 


Carbon Dioxide Level 25 mmol/L  


 


Anion Gap 10.0 mmol/L  


 


Blood Urea Nitrogen 18 mg/dl  


 


Creatinine 1.10 mg/dl  


 


Est Creatinine Clear Calc


Drug Dose 109.3 ml/min 


  


 


 


Estimated GFR (


American) 93.5 


  


 


 


Estimated GFR (Non-


American 80.6 


  


 


 


BUN/Creatinine Ratio 16.1  


 


Random Glucose 86 mg/dl  


 


Calcium Level 8.9 mg/dl  


 


Magnesium Level 2.1 mg/dl  


 


Total Bilirubin 1.0 mg/dl  


 


Aspartate Amino Transf


(AST/SGOT) 26 U/L 


  


 


 


Alanine Aminotransferase


(ALT/SGPT) 34 U/L 


  


 


 


Alkaline Phosphatase 108 U/L  


 


Total Creatine Kinase 94 U/L  


 


Creatine Kinase MB 1.7 ng/ml  


 


Creatine Kinase MB Ratio 1.8  


 


Total Protein 7.4 gm/dl  


 


Albumin 3.8 gm/dl  


 


Globulin 3.6 gm/dl  


 


Albumin/Globulin Ratio 1.1  


 


Thyroid Stimulating Hormone


(TSH) 1.790 uIu/ml 


  


 


 


Lyme Disease IgG Antibody NEG  


 


Lyme Disease IgM Antibody NEG  


 


Bedside Troponin I  < 0.030 ng/ml 








Imaging:  Chest x-ray demonstrated only postoperative changes. No acute 

cardiopulmonary disease





EKG:  Ectopic atrial tachycardia





Telemetry reviewed:  Ectopic atrial tachycardia competing with sinus rhythm





Cardiac catheterization 2012 without evidence of obstructive coronary disease





Echocardiogram performed as an outpatient November 2016:  Preserved LV systolic 

function.  Normal function of the prosthetic aortic valve.  Mild left 

ventricular and left atrial enlargement.  Mild concentric hypertrophy.  Stage I 

diastolic dysfunction





Assessment & Plan


1. Tachycardia:  Reviewed the patient's EKGs in comparison to old EKGs suggest 

this current rhythm is an ectopic atrial tachycardia.  P-wave morphology is 

suggestive of right atrial tachycardia.  Sounds as if he has had symptoms of 

this arrhythmia for some time although of shorter duration.  It is unclear why 

he is having more sustained episodes currently.  He has received some 

metoprolol in the emergency room without notable effect.  Fortunately, he has 

preserved LV systolic function has few symptoms associated with the tachycardia 

currently.  I think there are several options for treatment.  We can start with 

simple rate control agents such as diltiazem.  This may reduce some of the 

automaticity of the atrial focus and help control the heart rate.  That seems 

ineffectual we can consider antiarrhythmic therapy.  Unfortunately, given his 

structural heart disease our choices are limited.  He may respond quite well to 

sotalol or possibly dofetilide.  Dronedarone would be an option, and amiodarone 

is less desirable given his young age.  I think he was respond quite well to a 

class 1 C agent, but once again given his structural heart disease this is 

relatively contraindicated.  I also had a discussion with him regarding 

catheter based therapy.  I think there is the option for electrophysiologic 

testing and possible ablation.  Once again, given the negative P-wave in lead 

V1 I suspect this is right atrial in nature.  Given his aortic valve 

replacement I would not be enthusiastic about attempting of left atrial 

ablation via transseptal approach here at our facility.  However, this could be 

considered at a tertiary care facility if other efforts are not effective in 

controlling this arrhythmia.





2. Aortic valve disease:  Patient is status post mechanical aortic valve 

replacement.  This appears to be functioning well as of 2016. No evidence of 

dysfunction on exam today.  Patient is appropriately anticoagulated.  He should 

be maintained on warfarin currently.  No need to interrupt anticoagulation for 

any procedure.

## 2017-08-07 NOTE — EMERGENCY ROOM VISIT NOTE
History


First contact with patient:  14:55


Chief Complaint:  CARDIAC ASSESSMENT


Stated Complaint:  HEART FLUTTERING/SKIPPING/RACING/HEADACHE/PULSE


Nursing Triage Summary:  


Pt c/o heart racing, fluttering, skipping, had arythmia in highschool and has 

an 


artificial valve. Yesterday rate was 150's today 130's. "and I have a gnarly 


headache"





History of Present Illness


The patient is a 47 year old male who presents to the Emergency Room with 

complaints of "Heart fluttering, skipping, racing, headache, pulse".  The 

patient states he has a history of arrhythmia in high school, and had a heart 

valve replacement at [facility] 03/28/2012.  This was replacing 

down valve as per patient.  He states that he has been doing well, and takes 

Coumadin.  His last INR was 2.51 month ago.  He states that 1 week ago, he went 

to [facility] after eating developed a very high pulse.  He states that 

he was okay until yesterday when his pulse went up to 150 and has been 

persistently at that number.  He was unable to sleep last night secondary to 

shortness of breath in the high pulse rate.  He denies any chest pain.  Does 

have associated headache.  His history of hypertension.  He denies any history 

of blood clots, leg swelling.





Review of Systems


A complete 10-point Review of Systems was discussed with the patient, with 

pertinent positives and negatives listed in the History of Present Illness. All 

remaining Review of Systems questions can be considered negative unless 

otherwise specified.





Past Medical/Surgical History


Medical Problems:


(1) C. difficile colitis


(2) Palpitations


(3) Tachycardia


Surgical Problems:


(1) H/O mitral valve replacement








Family History





No pertinent family history





Social History


Smoking Status:  Former Smoker


Alcohol Use:  none


Drug Use:  none


Marital Status:  in relationship


Housing Status:  unknown


Occupation Status:  employed





Current/Historical Medications


Scheduled


Amoxicillin (Amoxil), 2,000 MG PO UD


Aspirin (Aspirin Ec), 81 MG PO QAM


Bupropion (Wellbutrin Sr), 150 MG PO BID


Escitalopram Oxalate (Lexapro), 20 MG PO HS


Febuxostat (Uloric), 40 MG PO QAM


Metoprolol Tartrate (Lopressor) (Lopressor), 150 MG PO QPM


Multiple Vitamin (Multivitamin), 1 TAB PO QAM


Probiotic Product (Align), 4 MG PO HS


Ropinirole (Requip), 0.5 MG PO HS


Simvastatin (Zocor), 40 MG PO QPM


Vancomycin Hcl (Vancomycin), 125 MG PO QPM


Warfarin Sod (Jantoven), 5 MG PO DAILY


Warfarin Sod (Jantoven), 2 MG PO DAILY


Zolpidem Tartrate (Ambien), 10 MG PO HS





Scheduled PRN


Lorazepam (Ativan *), 0.5 MG PO TID PRN





Physical Exam


Vital Signs











  Date Time  Temp Pulse Resp B/P (MAP) Pulse Ox O2 Delivery O2 Flow Rate FiO2


 


8/7/17 19:38  103 23 130/71 95   


 


8/7/17 19:05  115 26 117/83 96 Room Air  


 


8/7/17 17:21  132 19 123/91 96 Room Air  


 


8/7/17 16:44  116 32 134/80 97   


 


8/7/17 16:12  111 24 128/97 98 Room Air  


 


8/7/17 15:58  128 23 132/96 98 Room Air  


 


8/7/17 15:58  104  132/96    


 


8/7/17 15:32  136 17 112/96 98 Room Air  


 


8/7/17 15:31     98 Room Air  


 


8/7/17 15:21  134  154/118    


 


8/7/17 15:14  133      


 


8/7/17 14:48     96 Room Air  


 


8/7/17 14:45 36.7     Room Air  











Physical Exam


VITAL SIGNS - Vital signs and nursing notes were reviewed.  Patient is 

tachycardic, with stable blood pressure.  His O2 sat is appropriate.


GENERAL -47-year-old male appearing his stated age who is in no acute distress. 

Communicates well with provider and answers questions appropriately.


SKIN - Without rashes.


HEAD - NC/AT.


MOUTH/OROPHARYNX - Without perioral cyanosis. Buccal mucosa pink and moist and 

without leukoplakia. 


NECK - Neck with FROM. Supple to palpation.  No lymphadenopathy noted. No 

nuchal rigidity.


LUNGS - Chest wall symmetric without accessory muscle use, intercostals 

retractions, or central cyanosis. Normal vesicular breath sounds CTA B/L. No 

wheezes, rales, or rhonchi appreciated.


CARDIAC - RRR with S1/S2. No murmur, rubs, or gallops appreciated. 


NEUROLOGIC - Cranial nerves II through XII grossly intact. Sensory intact to 

light touch throughout. 


PSYCH - A&Ox3 and cooperates fully with examiner. Pt is very pleasant and 

interacts well with examiner.





Medical Decision & Procedures


ER Provider


Diagnostic Interpretation:


CHEST ONE VIEW PORTABLE





CLINICAL HISTORY: Dyspnea, tachycardia. Mechanical heart valve    





COMPARISON STUDY:  3/6/2015





FINDINGS: Mild stable cardiomegaly. Prior median sternotomy. Lungs are clear.


Diaphragms are smooth. 





IMPRESSION:  Chronic and postoperative change. No acute process. 

















The above report was generated using voice recognition software.  It may contain


grammatical, syntax or spelling errors.














Electronically signed by:  Loyd Torres M.D.


8/7/2017 4:49 PM





Dictated Date/Time:  8/7/2017 4:48 PM





Laboratory Results


8/7/17 15:25








Red Blood Count 5.21, Mean Corpuscular Volume 88.3, Mean Corpuscular Hemoglobin 

30.5, Mean Corpuscular Hemoglobin Concent 34.6, Mean Platelet Volume 10.7, 

Neutrophils (%) (Auto) 63.9, Lymphocytes (%) (Auto) 27.3, Monocytes (%) (Auto) 

7.2, Eosinophils (%) (Auto) 1.3, Basophils (%) (Auto) 0.2, Neutrophils # (Auto) 

5.82, Lymphocytes # (Auto) 2.49, Monocytes # (Auto) 0.66, Eosinophils # (Auto) 

0.12, Basophils # (Auto) 0.02





8/7/17 15:25

















Test


  8/7/17


15:25 8/7/17


15:38 8/7/17


17:50


 


White Blood Count


  9.12 K/uL


(4.8-10.8) 


  


 


 


Red Blood Count


  5.21 M/uL


(4.7-6.1) 


  


 


 


Hemoglobin


  15.9 g/dL


(14.0-18.0) 


  


 


 


Hematocrit 46.0 % (42-52)   


 


Mean Corpuscular Volume


  88.3 fL


() 


  


 


 


Mean Corpuscular Hemoglobin


  30.5 pg


(25-34) 


  


 


 


Mean Corpuscular Hemoglobin


Concent 34.6 g/dl


(32-36) 


  


 


 


Platelet Count


  251 K/uL


(130-400) 


  


 


 


Mean Platelet Volume


  10.7 fL


(7.4-10.4) 


  


 


 


Neutrophils (%) (Auto) 63.9 %   


 


Lymphocytes (%) (Auto) 27.3 %   


 


Monocytes (%) (Auto) 7.2 %   


 


Eosinophils (%) (Auto) 1.3 %   


 


Basophils (%) (Auto) 0.2 %   


 


Neutrophils # (Auto)


  5.82 K/uL


(1.4-6.5) 


  


 


 


Lymphocytes # (Auto)


  2.49 K/uL


(1.2-3.4) 


  


 


 


Monocytes # (Auto)


  0.66 K/uL


(0.11-0.59) 


  


 


 


Eosinophils # (Auto)


  0.12 K/uL


(0-0.5) 


  


 


 


Basophils # (Auto)


  0.02 K/uL


(0-0.2) 


  


 


 


RDW Standard Deviation


  44.2 fL


(36.4-46.3) 


  


 


 


RDW Coefficient of Variation


  13.6 %


(11.5-14.5) 


  


 


 


Immature Granulocyte % (Auto) 0.1 %   


 


Immature Granulocyte # (Auto)


  0.01 K/uL


(0.00-0.02) 


  


 


 


Prothrombin Time


  23.0 SECONDS


(9.0-12.0) 


  


 


 


Prothromb Time International


Ratio 2.1 (0.9-1.1) 


  


  


 


 


Activated Partial


Thromboplast Time 36.9 SECONDS


(21.0-31.0) 


  


 


 


Partial Thromboplastin Ratio 1.4   


 


Anion Gap


  10.0 mmol/L


(3-11) 


  


 


 


Est Creatinine Clear Calc


Drug Dose 109.3 ml/min 


  


  


 


 


Estimated GFR (


American) 93.5 


  


  


 


 


Estimated GFR (Non-


American 80.6 


  


  


 


 


BUN/Creatinine Ratio 16.1 (10-20)   


 


Calcium Level


  8.9 mg/dl


(8.5-10.1) 


  


 


 


Magnesium Level


  2.1 mg/dl


(1.8-2.4) 


  


 


 


Total Bilirubin


  1.0 mg/dl


(0.2-1) 


  


 


 


Aspartate Amino Transf


(AST/SGOT) 26 U/L (15-37) 


  


  


 


 


Alanine Aminotransferase


(ALT/SGPT) 34 U/L (12-78) 


  


  


 


 


Alkaline Phosphatase


  108 U/L


() 


  


 


 


Total Creatine Kinase


  94 U/L


() 


  


 


 


Creatine Kinase MB


  1.7 ng/ml


(0.5-3.6) 


  


 


 


Creatine Kinase MB Ratio 1.8 (0-3.0)   


 


Total Protein


  7.4 gm/dl


(6.4-8.2) 


  


 


 


Albumin


  3.8 gm/dl


(3.4-5.0) 


  


 


 


Globulin


  3.6 gm/dl


(2.5-4.0) 


  


 


 


Albumin/Globulin Ratio 1.1 (0.9-2)   


 


Thyroid Stimulating Hormone


(TSH) 1.790 uIu/ml


(0.300-4.500) 


  


 


 


Lyme Disease IgG Antibody NEG (NEG)   


 


Lyme Disease IgM Antibody NEG (NEG)   


 


Bedside Troponin I


  


  < 0.030 ng/ml


(0-0.045) 


 


 


Urine Color   YELLOW 


 


Urine Appearance   CLEAR (CLEAR) 


 


Urine pH   5.0 (4.5-7.5) 


 


Urine Specific Gravity


  


  


  1.017


(1.000-1.030)


 


Urine Protein   TRACE (NEG) 


 


Urine Glucose (UA)   NEG (NEG) 


 


Urine Ketones   NEG (NEG) 


 


Urine Occult Blood   3+ (NEG) 


 


Urine Nitrite   NEG (NEG) 


 


Urine Bilirubin   NEG (NEG) 


 


Urine Urobilinogen   NEG (NEG) 


 


Urine Leukocyte Esterase   TRACE (NEG) 


 


Urine WBC (Auto)   1-5 /hpf (0-5) 


 


Urine RBC (Auto)   0-4 /hpf (0-4) 


 


Urine Hyaline Casts (Auto)   0 /lpf (0-5) 


 


Urine Epithelial Cells (Auto)   0-5 /lpf (0-5) 


 


Urine Bacteria (Auto)   NEG (NEG) 


 


Urine Opiates Screen   NEG (NEG) 


 


Urine Methadone, Qualitative   NEG (NEG) 


 


Urine Barbiturates   NEG (NEG) 


 


Urine Phencyclidine (PCP)


Level 


  


  NEG (NEG) 


 


 


Ur


Amphetamine/Methamphetamine 


  


  NEG (NEG) 


 


 


MDMA (Ecstasy) Screen   POS (NEG) 


 


Urine Benzodiazepines Screen   NEG (NEG) 


 


Urine Cocaine Metabolite   NEG (NEG) 


 


Urine Marijuana (THC)   NEG (NEG) 











Medications Administered











 Medications


  (Trade)  Dose


 Ordered  Sig/Merry


 Route  Start Time


 Stop Time Status Last Admin


Dose Admin


 


 Metoprolol


 Tartrate


  (Lopressor Iv)  5 mg  NOW  STAT


 IV  8/7/17 15:15


 8/7/17 15:17 DC 8/7/17 15:21


5 MG


 


 Sodium Chloride  500 ml @ 


 999 mls/hr  Q31M STAT


 IV  8/7/17 15:52


 8/7/17 16:22 DC 8/7/17 15:52


999 MLS/HR


 


 Metoprolol


 Tartrate


  (Lopressor Iv)  5 mg  NOW  STAT


 IV  8/7/17 15:52


 8/7/17 15:54 DC 8/7/17 15:58


5 MG


 


 Acetaminophen


  (Tylenol Tab)  650 mg  NOW  STAT


 PO  8/7/17 18:02


 8/7/17 18:03 DC 8/7/17 19:08


650 MG


 


 Diltiazem HCl 125


 mg/Dextrose  125 ml @ 5


 mls/hr  Q24H PRN


 IV  8/7/17 19:00


 9/6/17 18:59  8/7/17 19:11


5 MLS/HR











Medical Decision


Patient was seen and evaluated as above.  After obtaining a thorough history 

and physical examination IV access was initiated, and the above workup was 

performed.  Patient is associated with shortness of breath, and tachycardia.  

He also has a headache.  EKG reveals tachycardia, without any clear 

intrafibrillation, or wide QRS complex.  No evidence of ST segment elevation 

myocardial infarction.  Chest x-ray unremarkable for acute process, with 

results as above.  CBC reveals no leukocytosis or anemia.  Coags revealed INR 

2.1.  CMP unremarkable.  Troponin negative.  TSH unremarkable.  Urine reveals 3

+ occult blood, and trace leukocyte esterase.  Urine reveals positive MDMA, but 

I suspect this is likely secondary to an underlying medication.  The patient 

was given 5 mg of metoprolol, and bolused with 500 mL's of normal saline.  The 

helped the rate slightly, then another 5 mg were ordered.  The case was 

discussed with the attending physician, and the decision was made to admit the 

patient for further evaluation and management.  Cardiology was consulted.  The 

case was discussed with Dr. Kocher.  His recommendations were appreciated.  

Please refer to further documentation regarding the patient's stay.  I spoke 

with [facility] hospitalist.





In evaluation treatment this patient the following differential diagnoses were 

entertained: Atrial tachycardia, Atrial fibrillation, a flutter, V. tach, PE, MI

, among others.





Impression





 Primary Impression:  


 Tachycardia





Departure Information


Dispostion


Admitted as an inpatient





Condition


FAIR





Referrals


Pro,Augusto GONZALEZ M.D. (PCP)





Patient Instructions


My [facility]

## 2017-08-08 VITALS
OXYGEN SATURATION: 92 % | HEART RATE: 82 BPM | SYSTOLIC BLOOD PRESSURE: 105 MMHG | TEMPERATURE: 98.24 F | DIASTOLIC BLOOD PRESSURE: 54 MMHG

## 2017-08-08 VITALS
OXYGEN SATURATION: 97 % | DIASTOLIC BLOOD PRESSURE: 89 MMHG | SYSTOLIC BLOOD PRESSURE: 128 MMHG | TEMPERATURE: 98.06 F | HEART RATE: 113 BPM

## 2017-08-08 VITALS
DIASTOLIC BLOOD PRESSURE: 91 MMHG | TEMPERATURE: 98.42 F | HEART RATE: 101 BPM | SYSTOLIC BLOOD PRESSURE: 138 MMHG | OXYGEN SATURATION: 95 %

## 2017-08-08 VITALS
OXYGEN SATURATION: 95 % | DIASTOLIC BLOOD PRESSURE: 91 MMHG | SYSTOLIC BLOOD PRESSURE: 138 MMHG | HEART RATE: 101 BPM | TEMPERATURE: 98.42 F

## 2017-08-08 VITALS
OXYGEN SATURATION: 95 % | HEART RATE: 86 BPM | DIASTOLIC BLOOD PRESSURE: 76 MMHG | SYSTOLIC BLOOD PRESSURE: 129 MMHG | TEMPERATURE: 97.88 F

## 2017-08-08 VITALS
OXYGEN SATURATION: 91 % | DIASTOLIC BLOOD PRESSURE: 59 MMHG | HEART RATE: 70 BPM | SYSTOLIC BLOOD PRESSURE: 105 MMHG | TEMPERATURE: 98.42 F

## 2017-08-08 VITALS — SYSTOLIC BLOOD PRESSURE: 110 MMHG | HEART RATE: 153 BPM | OXYGEN SATURATION: 97 % | DIASTOLIC BLOOD PRESSURE: 71 MMHG

## 2017-08-08 LAB
ANION GAP SERPL CALC-SCNC: 5 MMOL/L (ref 3–11)
BUN SERPL-MCNC: 16 MG/DL (ref 7–18)
BUN/CREAT SERPL: 14.6 (ref 10–20)
CALCIUM SERPL-MCNC: 8.8 MG/DL (ref 8.5–10.1)
CHLORIDE SERPL-SCNC: 109 MMOL/L (ref 98–107)
CKMB/CK RATIO: 1.5 (ref 0–3)
CO2 SERPL-SCNC: 28 MMOL/L (ref 21–32)
CREAT CL PREDICTED SERPL C-G-VRATE: 108.1 ML/MIN
CREAT SERPL-MCNC: 1.1 MG/DL (ref 0.6–1.4)
EOSINOPHIL NFR BLD AUTO: 263 K/UL (ref 130–400)
GLUCOSE SERPL-MCNC: 103 MG/DL (ref 70–99)
HCT VFR BLD CALC: 45.3 % (ref 42–52)
INR PPP: 1.9 (ref 0.9–1.1)
MAGNESIUM SERPL-MCNC: 2.2 MG/DL (ref 1.8–2.4)
MCH RBC QN AUTO: 29 PG (ref 25–34)
MCHC RBC AUTO-ENTMCNC: 32.9 G/DL (ref 32–36)
MCV RBC AUTO: 88.1 FL (ref 80–100)
PMV BLD AUTO: 10.2 FL (ref 7.4–10.4)
POTASSIUM SERPL-SCNC: 4 MMOL/L (ref 3.5–5.1)
PROTHROMBIN TIME: 21.2 SECONDS (ref 9–12)
RBC # BLD AUTO: 5.14 M/UL (ref 4.7–6.1)
SODIUM SERPL-SCNC: 142 MMOL/L (ref 136–145)
WBC # BLD AUTO: 7.6 K/UL (ref 4.8–10.8)

## 2017-08-08 RX ADMIN — LACTOBACILLUS TAB SCH TAB: TAB at 11:42

## 2017-08-08 RX ADMIN — ROPINIROLE HYDROCHLORIDE SCH MG: 0.25 TABLET, FILM COATED ORAL at 21:39

## 2017-08-08 RX ADMIN — BUPROPION HYDROCHLORIDE SCH MG: 150 TABLET, EXTENDED RELEASE ORAL at 21:39

## 2017-08-08 RX ADMIN — DILTIAZEM HYDROCHLORIDE PRN MLS/HR: 5 INJECTION INTRAVENOUS at 00:32

## 2017-08-08 RX ADMIN — BUPROPION HYDROCHLORIDE SCH MG: 150 TABLET, EXTENDED RELEASE ORAL at 07:39

## 2017-08-08 RX ADMIN — ESCITALOPRAM OXALATE SCH MG: 20 TABLET, FILM COATED ORAL at 21:40

## 2017-08-08 RX ADMIN — WARFARIN SCH MG: 10 TABLET ORAL at 15:55

## 2017-08-08 RX ADMIN — Medication SCH MG: at 23:02

## 2017-08-08 RX ADMIN — SIMVASTATIN SCH MG: 40 TABLET, FILM COATED ORAL at 21:40

## 2017-08-08 RX ADMIN — Medication SCH MG: at 07:39

## 2017-08-08 RX ADMIN — FEBUXOSTAT SCH MG: 40 TABLET ORAL at 07:38

## 2017-08-08 RX ADMIN — LACTOBACILLUS TAB SCH TAB: TAB at 15:56

## 2017-08-08 RX ADMIN — LACTOBACILLUS TAB SCH TAB: TAB at 07:38

## 2017-08-08 RX ADMIN — VANCOMYCIN HYDROCHLORIDE SCH MG: 1 INJECTION, POWDER, LYOPHILIZED, FOR SOLUTION INTRAVENOUS at 21:39

## 2017-08-08 RX ADMIN — Medication SCH ML: at 21:38

## 2017-08-08 RX ADMIN — Medication SCH TAB: at 07:38

## 2017-08-08 RX ADMIN — DILTIAZEM HYDROCHLORIDE PRN MLS/HR: 5 INJECTION INTRAVENOUS at 00:10

## 2017-08-08 NOTE — PROGRESS NOTE
Subjective


Date of Service:


Aug 8, 2017.


Subjective


Pt evaluation today including:  conversation w/ patient, physical exam, lab 

review, conversation w/ consultant, review of inpatient medication list


Pain:  no pain


PO Intake:  adequate


Voiding:  no voiding problems


patient feeling fine today, mild palpitations


reviewed tele, still with atrial tachycardia, rates 100-150's


was bradycardic with some AV block overnight on the diltiazem drip, stopped 

this AM





reviewed labs, troponin negative x 3


INR lower at 1.8





discussed with Dr Kocher, plans to start Sotalol in addition to metoprolol


ablation on Thursday 8/10





Problem List


Medical Problems:


(1) Petechial rash


Status: Acute  











Review of Systems


Cardiac:  + palpitations


All Other Systems:  Reviewed and Negative





Medications





Current Inpatient Medications








 Medications


  (Trade)  Dose


 Ordered  Sig/Merry


 Route  Start Time


 Stop Time Status Last Admin


Dose Admin


 


 Acetaminophen


  (Tylenol Tab)  650 mg  Q4H  PRN


 PO  8/7/17 18:00


 9/6/17 17:59   


 


 


 Al Hydrox/Mg


 Hydrox/Simethicone


  (Maalox Max Susp)  15 ml  Q4H  PRN


 PO  8/7/17 18:00


 9/6/17 17:59   


 


 


 Magnesium


 Hydroxide


  (Milk Of


 Magnesia Susp)  30 ml  Q12H  PRN


 PO  8/7/17 18:00


 9/6/17 17:59   


 


 


 Ondansetron HCl


  (Zofran Inj)  4 mg  Q6H  PRN


 IV  8/7/17 18:00


 9/6/17 17:59   


 


 


 Polyethylene


  (Miralax Powder


 Packet)  17 gm  DAILY  PRN


 PO  8/7/17 18:00


 9/6/17 17:59   


 


 


 Aspirin


  (Ecotrin Tab)  81 mg  QAM


 PO  8/8/17 09:00


 9/7/17 08:59  8/8/17 07:39


81 MG


 


 Bupropion HCl


  (Wellbutrin-Sr


 Tab)  150 mg  BID


 PO  8/7/17 21:00


 9/6/17 20:59  8/8/17 07:39


150 MG


 


 Escitalopram


 Oxalate


  (Lexapro Tab)  20 mg  HS


 PO  8/7/17 21:00


 9/6/17 20:59  8/7/17 20:55


20 MG


 


 Lorazepam


  (Ativan Tab)  0.5 mg  TID  PRN


 PO  8/7/17 18:00


 9/6/17 17:59   


 


 


 Multivitamins


  (Multivitamin


 Tab)  1 tab  QAM


 PO  8/8/17 09:00


 9/7/17 08:59  8/8/17 07:38


1 TAB


 


 Ropinirole HCl


  (Requip Tab)  0.5 mg  HS


 PO  8/7/17 21:00


 9/6/17 20:59  8/7/17 20:58


0.5 MG


 


 Simvastatin


  (Zocor Tab)  40 mg  QPM


 PO  8/7/17 21:00


 9/6/17 20:59  8/7/17 20:56


40 MG


 


 Vancomycin HCl


  (Vancomycin Oral


 Soln)  125 mg  QPM


 PO  8/7/17 21:00


 9/6/17 20:59  8/7/17 20:52


125 MG


 


 Warfarin Sodium


  (Coumadin Tab)  2 mg  DAILY@1600


 PO  8/7/17 21:00


 9/6/17 20:59 Future Hold 8/7/17 20:57


2 MG


 


 Warfarin Sodium


  (Coumadin Tab)  5 mg  DAILY@1600


 PO  8/7/17 21:00


 9/6/17 20:59 Future Hold 8/7/17 20:54


5 MG


 


 Zolpidem Tartrate


  (Ambien Tab)  10 mg  HS


 PO  8/7/17 21:00


 9/6/17 20:59  8/7/17 21:51


10 MG


 


 Lactobacillus


 Acidophilus


  (Floranex Tab)  4 tab  TIDM


 PO  8/8/17 07:30


 9/7/17 07:59  8/8/17 11:42


4 TAB


 


 Febuxostat


  (Uloric)  40 mg  QAM


 PO  8/8/17 09:00


 9/7/17 08:59  8/8/17 07:38


40 MG


 


 Raspberry


  (Raspberry Syrup


 5ml Cup)  5 ml  PM


 PO  8/7/17 21:00


 8/21/17 20:59  8/7/17 20:53


5 ML


 


 Warfarin Sodium


  (Coumadin Tab)  10 mg  DAILY@16


 PO  8/8/17 16:00


 9/7/17 15:59   


 


 


 Acetaminophen


  (Tylenol Tab)  650 mg  Q4H  PRN


 PO  8/8/17 12:15


 9/7/17 12:14   


 


 


 Diltiazem HCl 125


 mg/Dextrose  125 ml @ 0


 mls/hr  Q0M PRN


 IV  8/8/17 12:30


 9/7/17 12:29   


 











Objective


Vital Signs











  Date Time  Temp Pulse Resp B/P (MAP) Pulse Ox O2 Delivery O2 Flow Rate FiO2


 


8/8/17 11:23  153 22 110/71 (84) 97 Room Air  


 


8/8/17 08:23 36.6 86 16 129/76 (93) 95   


 


8/8/17 08:00      Room Air  


 


8/8/17 04:32 36.8 82 19 105/54 (71) 92 Room Air  


 


8/8/17 04:00      Room Air  


 


8/8/17 00:00 36.9 70 20 105/59 (74) 91 Room Air  


 


8/7/17 23:59      Room Air  


 


8/7/17 23:00  95  92/55 (67)    


 


8/7/17 22:00  120  126/81 (96)    


 


8/7/17 21:00  150  128/72 (90)    


 


8/7/17 20:10 36.8 76 18 137/78 98 Room Air  


 


8/7/17 19:38  103 23 130/71 95   


 


8/7/17 19:05  115 26 117/83 96 Room Air  


 


8/7/17 17:21  132 19 123/91 96 Room Air  


 


8/7/17 16:44  116 32 134/80 97   


 


8/7/17 16:12  111 24 128/97 98 Room Air  


 


8/7/17 15:58  128 23 132/96 98 Room Air  


 


8/7/17 15:58  104  132/96    


 


8/7/17 15:32  136 17 112/96 98 Room Air  


 


8/7/17 15:31     98 Room Air  


 


8/7/17 15:21  134  154/118    


 


8/7/17 15:14  133      


 


8/7/17 14:48     96 Room Air  


 


8/7/17 14:45 36.7     Room Air  











Physical Exam


General Appearance:  WD/WN, no apparent distress


Neck:  supple, no adenopathy, no JVD, trachea midline


Respiratory/Chest:  chest non-tender, lungs clear, normal breath sounds, no 

respiratory distress, no accessory muscle use


Cardiovascular:  no edema, no gallop, no JVD, no murmur, + tachycardia, + 

irregularly irregular, + pertinent finding (audible click of artificial valve)


Abdomen:  normal bowel sounds, non tender, soft, no organomegaly


Extremities:  normal range of motion, non-tender, normal inspection, no pedal 

edema, no calf tenderness, pelvis stable


Neurologic/Psychiatric:  CNs II-XII nml as tested, no motor/sensory deficits, 

alert, normal mood/affect, oriented x 3


Skin:  normal color, warm/dry, no rash


Lymphatic:  no adenopathy





Laboratory Results





Last 24 Hours








Test


  8/7/17


15:25 8/7/17


15:38 8/7/17


17:50 8/7/17


23:13


 


White Blood Count 9.12 K/uL    


 


Red Blood Count 5.21 M/uL    


 


Hemoglobin 15.9 g/dL    


 


Hematocrit 46.0 %    


 


Mean Corpuscular Volume 88.3 fL    


 


Mean Corpuscular Hemoglobin 30.5 pg    


 


Mean Corpuscular Hemoglobin


Concent 34.6 g/dl 


  


  


  


 


 


Platelet Count 251 K/uL    


 


Mean Platelet Volume 10.7 fL    


 


Neutrophils (%) (Auto) 63.9 %    


 


Lymphocytes (%) (Auto) 27.3 %    


 


Monocytes (%) (Auto) 7.2 %    


 


Eosinophils (%) (Auto) 1.3 %    


 


Basophils (%) (Auto) 0.2 %    


 


Neutrophils # (Auto) 5.82 K/uL    


 


Lymphocytes # (Auto) 2.49 K/uL    


 


Monocytes # (Auto) 0.66 K/uL    


 


Eosinophils # (Auto) 0.12 K/uL    


 


Basophils # (Auto) 0.02 K/uL    


 


RDW Standard Deviation 44.2 fL    


 


RDW Coefficient of Variation 13.6 %    


 


Immature Granulocyte % (Auto) 0.1 %    


 


Immature Granulocyte # (Auto) 0.01 K/uL    


 


Prothrombin Time 23.0 SECONDS    


 


Prothromb Time International


Ratio 2.1 


  


  


  


 


 


Activated Partial


Thromboplast Time 36.9 SECONDS 


  


  


  


 


 


Partial Thromboplastin Ratio 1.4    


 


Sodium Level 142 mmol/L    


 


Potassium Level 3.9 mmol/L    


 


Chloride Level 107 mmol/L    


 


Carbon Dioxide Level 25 mmol/L    


 


Anion Gap 10.0 mmol/L    


 


Blood Urea Nitrogen 18 mg/dl    


 


Creatinine 1.10 mg/dl    


 


Est Creatinine Clear Calc


Drug Dose 109.3 ml/min 


  


  


  


 


 


Estimated GFR (


American) 93.5 


  


  


  


 


 


Estimated GFR (Non-


American 80.6 


  


  


  


 


 


BUN/Creatinine Ratio 16.1    


 


Random Glucose 86 mg/dl    


 


Calcium Level 8.9 mg/dl    


 


Magnesium Level 2.1 mg/dl    


 


Total Bilirubin 1.0 mg/dl    


 


Aspartate Amino Transf


(AST/SGOT) 26 U/L 


  


  


  


 


 


Alanine Aminotransferase


(ALT/SGPT) 34 U/L 


  


  


  


 


 


Alkaline Phosphatase 108 U/L    


 


Total Creatine Kinase 94 U/L    84 U/L 


 


Creatine Kinase MB 1.7 ng/ml    1.3 ng/ml 


 


Creatine Kinase MB Ratio 1.8    1.5 


 


Total Protein 7.4 gm/dl    


 


Albumin 3.8 gm/dl    


 


Globulin 3.6 gm/dl    


 


Albumin/Globulin Ratio 1.1    


 


Thyroid Stimulating Hormone


(TSH) 1.790 uIu/ml 


  


  


  


 


 


Lyme Disease IgG Antibody NEG    


 


Lyme Disease IgM Antibody NEG    


 


Bedside Troponin I  < 0.030 ng/ml   


 


Urine Color   YELLOW  


 


Urine Appearance   CLEAR  


 


Urine pH   5.0  


 


Urine Specific Gravity   1.017  


 


Urine Protein   TRACE  


 


Urine Glucose (UA)   NEG  


 


Urine Ketones   NEG  


 


Urine Occult Blood   3+  


 


Urine Nitrite   NEG  


 


Urine Bilirubin   NEG  


 


Urine Urobilinogen   NEG  


 


Urine Leukocyte Esterase   TRACE  


 


Urine WBC (Auto)   1-5 /hpf  


 


Urine RBC (Auto)   0-4 /hpf  


 


Urine Hyaline Casts (Auto)   0 /lpf  


 


Urine Epithelial Cells (Auto)   0-5 /lpf  


 


Urine Bacteria (Auto)   NEG  


 


Urine Opiates Screen   NEG  


 


Urine Methadone, Qualitative   NEG  


 


Urine Barbiturates   NEG  


 


Urine Phencyclidine (PCP)


Level 


  


  NEG 


  


 


 


Ur


Amphetamine/Methamphetamine 


  


  NEG 


  


 


 


MDMA (Ecstasy) Screen   POS  


 


Urine Benzodiazepines Screen   NEG  


 


Urine Cocaine Metabolite   NEG  


 


Urine Marijuana (THC)   NEG  


 


Troponin I    < 0.015 ng/ml 


 


Test


  8/8/17


07:13 


  


  


 


 


White Blood Count 7.60 K/uL    


 


Red Blood Count 5.14 M/uL    


 


Hemoglobin 14.9 g/dL    


 


Hematocrit 45.3 %    


 


Mean Corpuscular Volume 88.1 fL    


 


Mean Corpuscular Hemoglobin 29.0 pg    


 


Mean Corpuscular Hemoglobin


Concent 32.9 g/dl 


  


  


  


 


 


RDW Standard Deviation 43.9 fL    


 


RDW Coefficient of Variation 13.5 %    


 


Platelet Count 263 K/uL    


 


Mean Platelet Volume 10.2 fL    


 


Prothrombin Time 21.2 SECONDS    


 


Prothromb Time International


Ratio 1.9 


  


  


  


 


 


Sodium Level 142 mmol/L    


 


Potassium Level 4.0 mmol/L    


 


Chloride Level 109 mmol/L    


 


Carbon Dioxide Level 28 mmol/L    


 


Anion Gap 5.0 mmol/L    


 


Blood Urea Nitrogen 16 mg/dl    


 


Creatinine 1.10 mg/dl    


 


Est Creatinine Clear Calc


Drug Dose 108.1 ml/min 


  


  


  


 


 


Estimated GFR (


American) 93.5 


  


  


  


 


 


Estimated GFR (Non-


American 80.6 


  


  


  


 


 


BUN/Creatinine Ratio 14.6    


 


Random Glucose 103 mg/dl    


 


Calcium Level 8.8 mg/dl    


 


Magnesium Level 2.2 mg/dl    


 


Total Creatine Kinase 81 U/L    


 


Creatine Kinase MB 1.2 ng/ml    


 


Creatine Kinase MB Ratio 1.5    


 


Troponin I < 0.015 ng/ml    











Assessment and Plan


48 y/o male with a history of mechanical AVR in March 2012, HTN, HLD, gout, 

depression and anxiety, RLS, CELINE, and h/o recurrent C. diff who presented to 

the ED on 8/7 with palpitations, dizziness and shortness of breath.  Pt arrived 

with heart rate in the 130s-150s but other vital signs stable.  Pt received 

Lopressor 5 mg IV x 2 doses in ED which did not help to bring rate down.  CXR 

shows no acute disease.  EKG shows ectopic atrial tachycardia.  Labs grossly 

unremarkable.  Pt seen by Dr. Kocher in the ED.





Atrial tachycardia


-responded well to Diltiazem drip, too well with some AV block and bradycardia 

so drip stopped this AM


- d/w Dr. Kocher, plan for ablation on Thursday


- will start Sotalol BID this PM in addition to metoprolol 150mg


- patient with occasional palpitations but otherwise fine





Mechanical AVR on chronic anticoagulation


-INR 2.1 on admission, dropped to 1.8 despite 7mg Coumadin


- will increase Coumadin to 10mg daily today


- per patient, he typically has issues with low INR, never had a high INR





HTN--stable


-Continue Lopressor 150 mg PO qpm





HLD


-Continue simvastatin 40 mg PO qd





Gout


-Continue Uloric 40 mg PO qd





Depression and anxiety


-Continue bupropion 150 mg PO BID, escitalopram 20 mg PO qd, and Ativan 0.5 mg 

PO TID prn anxiety





RLS


-Continue Requip 0.5 mg PO qhs





CELINE


-Set up CPAP





H/o recurrent C. 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 

chronic vanc per infectious disease


-Contact precautions


-Continue vancomycin 125 mg PO qd





DVT prophylaxis


-Warfarin


-KLAUDIA hose and SCDs





Code Status


-Level I, FULL RESUSCITATION STATUS





Plan for ablation on 8/10, start Sotalol, keep on tele

## 2017-08-08 NOTE — CARDIOLOGY FOLLOW-UP
Subjective


Date of Service:


Aug 8, 2017.


Pt evaluation today including:  conversation w/ patient, physical exam, chart 

review, lab review, review of studies, conversation w/ attending


History of Present Illness


Patient claims to be feeling well.  He is aware of the palpitations.  He denies 

any significant breathing difficulty or dizziness.  He has not been up to the 

commode due to his activity restrictions.  No symptoms of chest discomfort.  py.





Social History


Smoking Status:  Former Smoker


History of Alcohol Use:  Yes (Beer occassionally)





Review of Systems


Respiratory:  + see HPI


Cardiac:  + palpitations





Objective





Vital Signs Past 12 Hours








  Date Time  Temp Pulse Resp B/P (MAP) Pulse Ox O2 Delivery O2 Flow Rate FiO2


 


8/8/17 12:00      Room Air  


 


8/8/17 11:23  153 22 110/71 (84) 97 Room Air  


 


8/8/17 08:23 36.6 86 16 129/76 (93) 95   


 


8/8/17 08:00      Room Air  


 


8/8/17 04:32 36.8 82 19 105/54 (71) 92 Room Air  


 


8/8/17 04:00      Room Air  








Last Recorded Weight-Kilograms:  112.500


Physical Exam


The patient is alert and oriented. Mood and affect appeared normal. He answered 

all questions appropriately.


HEENT:  Pupils are equal and reactive to light and accommodation.  Extraocular 

movements are intact. The sclerae are anicteric.


Neuro:  Cranial nerves intact


Neck:  Patient's neck is supple.  He has palpable carotid pulses bilaterally 

without bruits on auscultation.  There is no evidence of jugular venous 

distention. The thyroid is not enlarged. 


Lungs:  Clear to auscultation bilaterally.  He has good air movement without 

use of accessory muscles. No rales wheezes or rhonchi.


Chest:  Keel a type scar along the sternum and epigastrium


Cardiac:  Heart demonstrates an irregular rate and rhythm some ectopy and 

tachycardia.  Normal S1 and mechanical S2. No murmurs on examination.


Pulses:  The patient has palpable radial pulses bilaterally that are equal in 

intensity


Extremities:  There was no evidence of hypoperfusion.  There is no cyanosis or 

clubbing.  There is no edema.


Skin:  I did not appreciate any rashes on examination today.





Data


Laboratory Results:





Last 24 Hours








Test


  8/7/17


15:25 8/7/17


15:38 8/7/17


17:50 8/7/17


23:13


 


White Blood Count 9.12 K/uL    


 


Red Blood Count 5.21 M/uL    


 


Hemoglobin 15.9 g/dL    


 


Hematocrit 46.0 %    


 


Mean Corpuscular Volume 88.3 fL    


 


Mean Corpuscular Hemoglobin 30.5 pg    


 


Mean Corpuscular Hemoglobin


Concent 34.6 g/dl 


  


  


  


 


 


Platelet Count 251 K/uL    


 


Mean Platelet Volume 10.7 fL    


 


Neutrophils (%) (Auto) 63.9 %    


 


Lymphocytes (%) (Auto) 27.3 %    


 


Monocytes (%) (Auto) 7.2 %    


 


Eosinophils (%) (Auto) 1.3 %    


 


Basophils (%) (Auto) 0.2 %    


 


Neutrophils # (Auto) 5.82 K/uL    


 


Lymphocytes # (Auto) 2.49 K/uL    


 


Monocytes # (Auto) 0.66 K/uL    


 


Eosinophils # (Auto) 0.12 K/uL    


 


Basophils # (Auto) 0.02 K/uL    


 


RDW Standard Deviation 44.2 fL    


 


RDW Coefficient of Variation 13.6 %    


 


Immature Granulocyte % (Auto) 0.1 %    


 


Immature Granulocyte # (Auto) 0.01 K/uL    


 


Prothrombin Time 23.0 SECONDS    


 


Prothromb Time International


Ratio 2.1 


  


  


  


 


 


Activated Partial


Thromboplast Time 36.9 SECONDS 


  


  


  


 


 


Partial Thromboplastin Ratio 1.4    


 


Sodium Level 142 mmol/L    


 


Potassium Level 3.9 mmol/L    


 


Chloride Level 107 mmol/L    


 


Carbon Dioxide Level 25 mmol/L    


 


Anion Gap 10.0 mmol/L    


 


Blood Urea Nitrogen 18 mg/dl    


 


Creatinine 1.10 mg/dl    


 


Est Creatinine Clear Calc


Drug Dose 109.3 ml/min 


  


  


  


 


 


Estimated GFR (


American) 93.5 


  


  


  


 


 


Estimated GFR (Non-


American 80.6 


  


  


  


 


 


BUN/Creatinine Ratio 16.1    


 


Random Glucose 86 mg/dl    


 


Calcium Level 8.9 mg/dl    


 


Magnesium Level 2.1 mg/dl    


 


Total Bilirubin 1.0 mg/dl    


 


Aspartate Amino Transf


(AST/SGOT) 26 U/L 


  


  


  


 


 


Alanine Aminotransferase


(ALT/SGPT) 34 U/L 


  


  


  


 


 


Alkaline Phosphatase 108 U/L    


 


Total Creatine Kinase 94 U/L    84 U/L 


 


Creatine Kinase MB 1.7 ng/ml    1.3 ng/ml 


 


Creatine Kinase MB Ratio 1.8    1.5 


 


Total Protein 7.4 gm/dl    


 


Albumin 3.8 gm/dl    


 


Globulin 3.6 gm/dl    


 


Albumin/Globulin Ratio 1.1    


 


Thyroid Stimulating Hormone


(TSH) 1.790 uIu/ml 


  


  


  


 


 


Lyme Disease IgG Antibody NEG    


 


Lyme Disease IgM Antibody NEG    


 


Bedside Troponin I  < 0.030 ng/ml   


 


Urine Color   YELLOW  


 


Urine Appearance   CLEAR  


 


Urine pH   5.0  


 


Urine Specific Gravity   1.017  


 


Urine Protein   TRACE  


 


Urine Glucose (UA)   NEG  


 


Urine Ketones   NEG  


 


Urine Occult Blood   3+  


 


Urine Nitrite   NEG  


 


Urine Bilirubin   NEG  


 


Urine Urobilinogen   NEG  


 


Urine Leukocyte Esterase   TRACE  


 


Urine WBC (Auto)   1-5 /hpf  


 


Urine RBC (Auto)   0-4 /hpf  


 


Urine Hyaline Casts (Auto)   0 /lpf  


 


Urine Epithelial Cells (Auto)   0-5 /lpf  


 


Urine Bacteria (Auto)   NEG  


 


Urine Opiates Screen   NEG  


 


Urine Methadone, Qualitative   NEG  


 


Urine Barbiturates   NEG  


 


Urine Phencyclidine (PCP)


Level 


  


  NEG 


  


 


 


Ur


Amphetamine/Methamphetamine 


  


  NEG 


  


 


 


MDMA (Ecstasy) Screen   POS  


 


Urine Benzodiazepines Screen   NEG  


 


Urine Cocaine Metabolite   NEG  


 


Urine Marijuana (THC)   NEG  


 


Troponin I    < 0.015 ng/ml 


 


Test


  8/8/17


07:13 


  


  


 


 


White Blood Count 7.60 K/uL    


 


Red Blood Count 5.14 M/uL    


 


Hemoglobin 14.9 g/dL    


 


Hematocrit 45.3 %    


 


Mean Corpuscular Volume 88.1 fL    


 


Mean Corpuscular Hemoglobin 29.0 pg    


 


Mean Corpuscular Hemoglobin


Concent 32.9 g/dl 


  


  


  


 


 


RDW Standard Deviation 43.9 fL    


 


RDW Coefficient of Variation 13.5 %    


 


Platelet Count 263 K/uL    


 


Mean Platelet Volume 10.2 fL    


 


Prothrombin Time 21.2 SECONDS    


 


Prothromb Time International


Ratio 1.9 


  


  


  


 


 


Sodium Level 142 mmol/L    


 


Potassium Level 4.0 mmol/L    


 


Chloride Level 109 mmol/L    


 


Carbon Dioxide Level 28 mmol/L    


 


Anion Gap 5.0 mmol/L    


 


Blood Urea Nitrogen 16 mg/dl    


 


Creatinine 1.10 mg/dl    


 


Est Creatinine Clear Calc


Drug Dose 108.1 ml/min 


  


  


  


 


 


Estimated GFR (


American) 93.5 


  


  


  


 


 


Estimated GFR (Non-


American 80.6 


  


  


  


 


 


BUN/Creatinine Ratio 14.6    


 


Random Glucose 103 mg/dl    


 


Calcium Level 8.8 mg/dl    


 


Magnesium Level 2.2 mg/dl    


 


Total Creatine Kinase 81 U/L    


 


Creatine Kinase MB 1.2 ng/ml    


 


Creatine Kinase MB Ratio 1.5    


 


Troponin I < 0.015 ng/ml    














EKG:  Sinus rhythm with brief episodes of atrial tachycardia.





Telemetry reviewed:  Continued episodes of frequent atrial tachycardia





Assessment and Plan


1. Tachycardia:  He continues to have frequent in sustained episodes of an 

atrial tachycardia.  There is Mobitz 1 conduction at times, primarily while 

sleeping.  He is aware of the palpitations but has no other symptoms.  No 

evidence of congestive heart failure currently.  There did not appear to be any 

benefit from use of diltiazem.  We did discuss the use of antiarrhythmics but 

with his current medical therapy addition of a class 3 agent is 

contraindicated.  There is a risk of QT prolongation with Lexapro.  Given his 

structural heart disease is not a good candidate for class 1 C agent.  I did 

discuss with him other options including catheter based therapy.  I think we 

are moving in that direction given the lack of efficacy with beta-blockers and 

diltiazem and contraindications to most anti rhythmics.  Will try some digoxin 

while he is here in the hospital and we await the availability of mapping for 

an EP study planned on Thursday 2. Aortic valve disease:  Patient is status post mechanical aortic valve 

replacement.  This appears to be functioning well as of 2016. No evidence of 

dysfunction on exam today.  Patient is appropriately anticoagulated.  He should 

be maintained on warfarin currently.  No need to interrupt anticoagulation for 

any procedure.

## 2017-08-09 VITALS
DIASTOLIC BLOOD PRESSURE: 76 MMHG | OXYGEN SATURATION: 96 % | SYSTOLIC BLOOD PRESSURE: 113 MMHG | TEMPERATURE: 98.24 F | HEART RATE: 69 BPM

## 2017-08-09 VITALS
OXYGEN SATURATION: 95 % | TEMPERATURE: 98.24 F | HEART RATE: 120 BPM | DIASTOLIC BLOOD PRESSURE: 81 MMHG | SYSTOLIC BLOOD PRESSURE: 116 MMHG

## 2017-08-09 VITALS
OXYGEN SATURATION: 97 % | TEMPERATURE: 98.24 F | DIASTOLIC BLOOD PRESSURE: 96 MMHG | HEART RATE: 128 BPM | SYSTOLIC BLOOD PRESSURE: 144 MMHG

## 2017-08-09 VITALS
OXYGEN SATURATION: 97 % | TEMPERATURE: 97.88 F | DIASTOLIC BLOOD PRESSURE: 89 MMHG | SYSTOLIC BLOOD PRESSURE: 129 MMHG | HEART RATE: 97 BPM

## 2017-08-09 VITALS
SYSTOLIC BLOOD PRESSURE: 114 MMHG | OXYGEN SATURATION: 95 % | TEMPERATURE: 98.42 F | HEART RATE: 58 BPM | DIASTOLIC BLOOD PRESSURE: 76 MMHG

## 2017-08-09 VITALS
TEMPERATURE: 98.42 F | DIASTOLIC BLOOD PRESSURE: 64 MMHG | OXYGEN SATURATION: 98 % | SYSTOLIC BLOOD PRESSURE: 104 MMHG | HEART RATE: 128 BPM

## 2017-08-09 VITALS
TEMPERATURE: 98.42 F | HEART RATE: 122 BPM | OXYGEN SATURATION: 96 % | SYSTOLIC BLOOD PRESSURE: 144 MMHG | DIASTOLIC BLOOD PRESSURE: 87 MMHG

## 2017-08-09 LAB
ANION GAP SERPL CALC-SCNC: 6 MMOL/L (ref 3–11)
BUN SERPL-MCNC: 17 MG/DL (ref 7–18)
BUN/CREAT SERPL: 14.3 (ref 10–20)
CALCIUM SERPL-MCNC: 8.7 MG/DL (ref 8.5–10.1)
CHLORIDE SERPL-SCNC: 109 MMOL/L (ref 98–107)
CO2 SERPL-SCNC: 27 MMOL/L (ref 21–32)
CREAT CL PREDICTED SERPL C-G-VRATE: 99.7 ML/MIN
CREAT SERPL-MCNC: 1.2 MG/DL (ref 0.6–1.4)
EOSINOPHIL NFR BLD AUTO: 258 K/UL (ref 130–400)
GLUCOSE SERPL-MCNC: 116 MG/DL (ref 70–99)
HCT VFR BLD CALC: 45.3 % (ref 42–52)
INR PPP: 2 (ref 0.9–1.1)
MAGNESIUM SERPL-MCNC: 2.1 MG/DL (ref 1.8–2.4)
MCH RBC QN AUTO: 29.7 PG (ref 25–34)
MCHC RBC AUTO-ENTMCNC: 33.8 G/DL (ref 32–36)
MCV RBC AUTO: 87.8 FL (ref 80–100)
PMV BLD AUTO: 10.6 FL (ref 7.4–10.4)
POTASSIUM SERPL-SCNC: 4 MMOL/L (ref 3.5–5.1)
PROTHROMBIN TIME: 21.6 SECONDS (ref 9–12)
RBC # BLD AUTO: 5.16 M/UL (ref 4.7–6.1)
SODIUM SERPL-SCNC: 142 MMOL/L (ref 136–145)
WBC # BLD AUTO: 8.56 K/UL (ref 4.8–10.8)

## 2017-08-09 RX ADMIN — ESCITALOPRAM OXALATE SCH MG: 20 TABLET, FILM COATED ORAL at 20:32

## 2017-08-09 RX ADMIN — Medication SCH ML: at 20:34

## 2017-08-09 RX ADMIN — SIMVASTATIN SCH MG: 40 TABLET, FILM COATED ORAL at 20:32

## 2017-08-09 RX ADMIN — ACETAMINOPHEN PRN MG: 325 TABLET ORAL at 07:31

## 2017-08-09 RX ADMIN — LACTOBACILLUS TAB SCH TAB: TAB at 11:45

## 2017-08-09 RX ADMIN — ACETAMINOPHEN PRN MG: 325 TABLET ORAL at 17:30

## 2017-08-09 RX ADMIN — METOPROLOL TARTRATE SCH MG: 50 TABLET, FILM COATED ORAL at 20:34

## 2017-08-09 RX ADMIN — LACTOBACILLUS TAB SCH TAB: TAB at 16:02

## 2017-08-09 RX ADMIN — Medication SCH MG: at 22:06

## 2017-08-09 RX ADMIN — ROPINIROLE HYDROCHLORIDE SCH MG: 0.25 TABLET, FILM COATED ORAL at 20:33

## 2017-08-09 RX ADMIN — FEBUXOSTAT SCH MG: 40 TABLET ORAL at 07:33

## 2017-08-09 RX ADMIN — Medication SCH MG: at 07:33

## 2017-08-09 RX ADMIN — Medication SCH TAB: at 07:33

## 2017-08-09 RX ADMIN — BUPROPION HYDROCHLORIDE SCH MG: 150 TABLET, EXTENDED RELEASE ORAL at 20:32

## 2017-08-09 RX ADMIN — VANCOMYCIN HYDROCHLORIDE SCH MG: 1 INJECTION, POWDER, LYOPHILIZED, FOR SOLUTION INTRAVENOUS at 20:38

## 2017-08-09 RX ADMIN — WARFARIN SCH MG: 10 TABLET ORAL at 16:01

## 2017-08-09 RX ADMIN — LACTOBACILLUS TAB SCH TAB: TAB at 07:32

## 2017-08-09 RX ADMIN — METOPROLOL TARTRATE SCH MG: 50 TABLET, FILM COATED ORAL at 09:40

## 2017-08-09 RX ADMIN — BUPROPION HYDROCHLORIDE SCH MG: 150 TABLET, EXTENDED RELEASE ORAL at 07:34

## 2017-08-09 NOTE — PROGRESS NOTE
Subjective


Date of Service:


Aug 9, 2017.


Subjective


Pt evaluation today including:  conversation w/ patient, physical exam, lab 

review, conversation w/ consultant


Pain:  no pain


PO Intake:  adequate


Voiding:  no voiding problems


patient feeling well, had a HA with tachycardia yesterday


no symptoms today


plans for ablation on 8/10





Problem List


Medical Problems:


(1) Petechial rash


Status: Acute  











Review of Systems


All Other Systems:  Reviewed and Negative





Medications





Current Inpatient Medications








 Medications


  (Trade)  Dose


 Ordered  Sig/Merry


 Route  Start Time


 Stop Time Status Last Admin


Dose Admin


 


 Acetaminophen


  (Tylenol Tab)  650 mg  Q4H  PRN


 PO  8/7/17 18:00


 9/6/17 17:59  8/9/17 07:31


650 MG


 


 Al Hydrox/Mg


 Hydrox/Simethicone


  (Maalox Max Susp)  15 ml  Q4H  PRN


 PO  8/7/17 18:00


 9/6/17 17:59   


 


 


 Magnesium


 Hydroxide


  (Milk Of


 Magnesia Susp)  30 ml  Q12H  PRN


 PO  8/7/17 18:00


 9/6/17 17:59   


 


 


 Ondansetron HCl


  (Zofran Inj)  4 mg  Q6H  PRN


 IV  8/7/17 18:00


 9/6/17 17:59   


 


 


 Polyethylene


  (Miralax Powder


 Packet)  17 gm  DAILY  PRN


 PO  8/7/17 18:00


 9/6/17 17:59   


 


 


 Aspirin


  (Ecotrin Tab)  81 mg  QAM


 PO  8/8/17 09:00


 9/7/17 08:59  8/9/17 07:33


81 MG


 


 Bupropion HCl


  (Wellbutrin-Sr


 Tab)  150 mg  BID


 PO  8/7/17 21:00


 9/6/17 20:59  8/9/17 07:34


150 MG


 


 Escitalopram


 Oxalate


  (Lexapro Tab)  20 mg  HS


 PO  8/7/17 21:00


 9/6/17 20:59  8/8/17 21:40


20 MG


 


 Lorazepam


  (Ativan Tab)  0.5 mg  TID  PRN


 PO  8/7/17 18:00


 9/6/17 17:59  8/9/17 14:39


0.5 MG


 


 Multivitamins


  (Multivitamin


 Tab)  1 tab  QAM


 PO  8/8/17 09:00


 9/7/17 08:59  8/9/17 07:33


1 TAB


 


 Ropinirole HCl


  (Requip Tab)  0.5 mg  HS


 PO  8/7/17 21:00


 9/6/17 20:59  8/8/17 21:39


0.5 MG


 


 Simvastatin


  (Zocor Tab)  40 mg  QPM


 PO  8/7/17 21:00


 9/6/17 20:59  8/8/17 21:40


40 MG


 


 Vancomycin HCl


  (Vancomycin Oral


 Soln)  125 mg  QPM


 PO  8/7/17 21:00


 9/6/17 20:59  8/8/17 21:39


125 MG


 


 Warfarin Sodium


  (Coumadin Tab)  2 mg  DAILY@1600


 PO  8/7/17 21:00


 9/6/17 20:59 Future Hold 8/7/17 20:57


2 MG


 


 Warfarin Sodium


  (Coumadin Tab)  5 mg  DAILY@1600


 PO  8/7/17 21:00


 9/6/17 20:59 Future Hold 8/7/17 20:54


5 MG


 


 Zolpidem Tartrate


  (Ambien Tab)  10 mg  HS


 PO  8/7/17 21:00


 9/6/17 20:59  8/8/17 23:02


10 MG


 


 Lactobacillus


 Acidophilus


  (Floranex Tab)  4 tab  TIDM


 PO  8/8/17 07:30


 9/7/17 07:59  8/9/17 11:45


4 TAB


 


 Febuxostat


  (Uloric)  40 mg  QAM


 PO  8/8/17 09:00


 9/7/17 08:59  8/9/17 07:33


40 MG


 


 Raspberry


  (Raspberry Syrup


 5ml Cup)  5 ml  PM


 PO  8/7/17 21:00


 8/21/17 20:59  8/8/17 21:38


5 ML


 


 Warfarin Sodium


  (Coumadin Tab)  10 mg  DAILY@16


 PO  8/8/17 16:00


 9/7/17 15:59  8/8/17 15:55


10 MG


 


 Acetaminophen


  (Tylenol Tab)  650 mg  Q4H  PRN


 PO  8/8/17 12:15


 9/7/17 12:14  8/8/17 14:35


650 MG


 


 Metoprolol


 Tartrate


  (Lopressor Tab)  50 mg  BID


 PO  8/9/17 09:00


 9/8/17 08:59  8/9/17 09:40


50 MG











Objective


Vital Signs











  Date Time  Temp Pulse Resp B/P (MAP) Pulse Ox O2 Delivery O2 Flow Rate FiO2


 


8/9/17 12:00      Room Air  


 


8/9/17 11:41 36.9 128 18 104/64 (77) 98   


 


8/9/17 09:12  130      


 


8/9/17 08:00      Room Air  


 


8/9/17 07:29 36.8 128 20 144/96 (112) 97 Room Air  


 


8/9/17 04:00      Room Air  


 


8/9/17 03:49 36.8 120 19 116/81 (93) 95 Room Air  


 


8/9/17 00:00 36.9 122 20 144/87 (106) 96 Room Air  


 


8/8/17 23:59      Room Air  


 


8/8/17 20:39  110      


 


8/8/17 20:00      Room Air  


 


8/8/17 19:43 36.9 101 20 138/91 (107) 95 Room Air  


 


8/8/17 19:31 36.9 101 20 138/91 (107) 95 Room Air  


 


8/8/17 16:00      Room Air  











Physical Exam


General Appearance:  WD/WN, no apparent distress


ENT:  normal ENT inspection, hearing grossly normal, pharynx normal


Neck:  supple, no adenopathy, no JVD, trachea midline


Respiratory/Chest:  chest non-tender, lungs clear, normal breath sounds, no 

respiratory distress, no accessory muscle use


Cardiovascular:  no edema, no gallop, no JVD, no murmur, + tachycardia, + 

irregularly irregular, + pertinent finding (audible click of valve)


Abdomen:  normal bowel sounds, non tender, soft, no organomegaly


Extremities:  normal range of motion, non-tender, normal inspection, no pedal 

edema, no calf tenderness, pelvis stable


Neurologic/Psychiatric:  CNs II-XII nml as tested, no motor/sensory deficits, 

alert, normal mood/affect, oriented x 3


Skin:  normal color, warm/dry, no rash





Laboratory Results





Last 24 Hours








Test


  8/9/17


06:24


 


White Blood Count 8.56 K/uL 


 


Red Blood Count 5.16 M/uL 


 


Hemoglobin 15.3 g/dL 


 


Hematocrit 45.3 % 


 


Mean Corpuscular Volume 87.8 fL 


 


Mean Corpuscular Hemoglobin 29.7 pg 


 


Mean Corpuscular Hemoglobin


Concent 33.8 g/dl 


 


 


RDW Standard Deviation 42.9 fL 


 


RDW Coefficient of Variation 13.3 % 


 


Platelet Count 258 K/uL 


 


Mean Platelet Volume 10.6 fL 


 


Prothrombin Time 21.6 SECONDS 


 


Prothromb Time International


Ratio 2.0 


 


 


Sodium Level 142 mmol/L 


 


Potassium Level 4.0 mmol/L 


 


Chloride Level 109 mmol/L 


 


Carbon Dioxide Level 27 mmol/L 


 


Anion Gap 6.0 mmol/L 


 


Blood Urea Nitrogen 17 mg/dl 


 


Creatinine 1.20 mg/dl 


 


Est Creatinine Clear Calc


Drug Dose 99.7 ml/min 


 


 


Estimated GFR (


American) 84.1 


 


 


Estimated GFR (Non-


American 72.6 


 


 


BUN/Creatinine Ratio 14.3 


 


Random Glucose 116 mg/dl 


 


Calcium Level 8.7 mg/dl 


 


Magnesium Level 2.1 mg/dl 











Assessment and Plan


46 y/o male with a history of mechanical AVR in March 2012, HTN, HLD, gout, 

depression and anxiety, RLS, CELINE, and h/o recurrent C. diff who presented to 

the ED on 8/7 with palpitations, dizziness and shortness of breath.  Pt arrived 

with heart rate in the 130s-150s but other vital signs stable.  Pt received 

Lopressor 5 mg IV x 2 doses in ED which did not help to bring rate down.  CXR 

shows no acute disease.  EKG shows ectopic atrial tachycardia.  Labs grossly 

unremarkable.  Pt seen by Dr. Kocher in the ED.





Atrial tachycardia


- not responding to medications, tried class III anti-arrhythmic but in 

interacts with Lexapro


- no response to Digoxin, Diltiazem or metoprolol


- plan for ablation tomorrow





Mechanical AVR on chronic anticoagulation


-INR 2.1 on admission, dropped to 1.8 despite 7mg Coumadin


- increased Coumadin to 10mg daily, INR only 2.0 today, continue 10mg


- per patient, he typically has issues with low INR, never had a high INR





HTN--stable


-Continue Lopressor 150 mg PO qpm





HLD


-Continue simvastatin 40 mg PO qd





Gout


-Continue Uloric 40 mg PO qd





Depression and anxiety


-Continue bupropion 150 mg PO BID, escitalopram 20 mg PO qd, and Ativan 0.5 mg 

PO TID prn anxiety





RLS


-Continue Requip 0.5 mg PO qhs





CELINE


-Set up CPAP





H/o recurrent C. 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 

chronic vanc per infectious disease


-Contact precautions


-Continue vancomycin 125 mg PO qd





DVT prophylaxis


-Warfarin


-KLAUDIA hose and SCDs





Code Status


-Level I, FULL RESUSCITATION STATUS





Plan for ablation on 8/10

## 2017-08-09 NOTE — CARDIOLOGY FOLLOW-UP
Subjective


Date of Service:


Aug 9, 2017.


Pt evaluation today including:  conversation w/ patient, physical exam, chart 

review, lab review, review of studies


History of Present Illness


Overall the patient has been feeling well.  He did have an episode yesterday 

where he notices heart racing and he had some associated head pain and mild 

dyspnea.  He states this is what he felt as an outpatient on occasion.  He has 

been ambulatory around the room and the melchor without symptoms.  Generally 

speaking he denies dizziness.  He has not had significant shortness of breath.  

He has no symptoms of chest discomfort.





Social History


Smoking Status:  Former Smoker


History of Alcohol Use:  Yes (Beer occassionally)





Review of Systems


Respiratory:  + see HPI


Cardiac:  + palpitations





Objective





Vital Signs Past 12 Hours








  Date Time  Temp Pulse Resp B/P (MAP) Pulse Ox O2 Delivery O2 Flow Rate FiO2


 


8/9/17 09:12  130      


 


8/9/17 08:00      Room Air  


 


8/9/17 07:29 36.8 128 20 144/96 (112) 97 Room Air  


 


8/9/17 04:00      Room Air  


 


8/9/17 03:49 36.8 120 19 116/81 (93) 95 Room Air  


 


8/9/17 00:00 36.9 122 20 144/87 (106) 96 Room Air  


 


8/8/17 23:59      Room Air  








Last Recorded Weight-Kilograms:  113.900


Physical Exam


The patient is alert and oriented. Mood and affect appeared normal. He answered 

all questions appropriately.


HEENT:  Pupils are equal and reactive to light and accommodation.  Extraocular 

movements are intact. The sclerae are anicteric.


Neuro:  Cranial nerves intact


Neck:  Patient's neck is supple.  He has palpable carotid pulses bilaterally 

without bruits on auscultation.  There is no evidence of jugular venous 

distention. The thyroid is not enlarged. 


Lungs:  Clear to auscultation bilaterally.  He has good air movement without 

use of accessory muscles. No rales wheezes or rhonchi.


Chest:  Keel a type scar along the sternum and epigastrium


Cardiac:  Heart demonstrates an irregular rate and rhythm some ectopy and 

tachycardia.  Normal S1 and mechanical S2. No murmurs on examination.


Pulses:  The patient has palpable radial pulses bilaterally that are equal in 

intensity


Extremities:  There was no evidence of hypoperfusion.  There is no cyanosis or 

clubbing.  There is no edema.


Skin:  I did not appreciate any rashes on examination today.





Data


Laboratory Results:





Last 24 Hours








Test


  8/9/17


06:24


 


White Blood Count 8.56 K/uL 


 


Red Blood Count 5.16 M/uL 


 


Hemoglobin 15.3 g/dL 


 


Hematocrit 45.3 % 


 


Mean Corpuscular Volume 87.8 fL 


 


Mean Corpuscular Hemoglobin 29.7 pg 


 


Mean Corpuscular Hemoglobin


Concent 33.8 g/dl 


 


 


RDW Standard Deviation 42.9 fL 


 


RDW Coefficient of Variation 13.3 % 


 


Platelet Count 258 K/uL 


 


Mean Platelet Volume 10.6 fL 


 


Prothrombin Time 21.6 SECONDS 


 


Prothromb Time International


Ratio 2.0 


 


 


Sodium Level 142 mmol/L 


 


Potassium Level 4.0 mmol/L 


 


Chloride Level 109 mmol/L 


 


Carbon Dioxide Level 27 mmol/L 


 


Anion Gap 6.0 mmol/L 


 


Blood Urea Nitrogen 17 mg/dl 


 


Creatinine 1.20 mg/dl 


 


Est Creatinine Clear Calc


Drug Dose 99.7 ml/min 


 


 


Estimated GFR (


American) 84.1 


 


 


Estimated GFR (Non-


American 72.6 


 


 


BUN/Creatinine Ratio 14.3 


 


Random Glucose 116 mg/dl 


 


Calcium Level 8.7 mg/dl 


 


Magnesium Level 2.1 mg/dl 








Imaging: 





EKG:





Telemetry reviewed:





Assessment and Plan


1. Tachycardia:  He continues to have frequent in sustained episodes of an 

atrial tachycardia.  I was hoping to start the patient on a class 3 

antiarrhythmic yesterday, but there seems to be an interaction with his other 

medical therapy, specifically Lexapro.  As such we tried digoxin as he has had 

symptomatic relief with this medication remotely.  However, it does not appear 

to have had a significant effect.  We will continue on his beta-blocker and 

digoxin for the day but it is likely he will require an electrophysiologic 

study and possible ablation tomorrow.  Based on his EKG, it appears the rhythm 

originates in the right atrium.  However, true localization of the atrial 

tachycardia will require invasive study.  This may in fact be a pulmonary vein 

focus which would need to be approached from the left atrium.  Given his 

history of valve replacement and the limited equipment available at our facility

, left atrial procedure would likely require transfer to tertiary care center.





2. Aortic valve disease:  Patient is status post mechanical aortic valve 

replacement.  Patient is appropriately anticoagulated.  He should be maintained 

on warfarin currently.  No need to interrupt anticoagulation for any procedure.

## 2017-08-10 VITALS — SYSTOLIC BLOOD PRESSURE: 135 MMHG | DIASTOLIC BLOOD PRESSURE: 91 MMHG | HEART RATE: 75 BPM | OXYGEN SATURATION: 97 %

## 2017-08-10 VITALS — DIASTOLIC BLOOD PRESSURE: 87 MMHG | OXYGEN SATURATION: 98 % | SYSTOLIC BLOOD PRESSURE: 138 MMHG | HEART RATE: 61 BPM

## 2017-08-10 VITALS
TEMPERATURE: 98.42 F | HEART RATE: 64 BPM | DIASTOLIC BLOOD PRESSURE: 83 MMHG | OXYGEN SATURATION: 96 % | SYSTOLIC BLOOD PRESSURE: 134 MMHG

## 2017-08-10 VITALS
OXYGEN SATURATION: 95 % | SYSTOLIC BLOOD PRESSURE: 125 MMHG | TEMPERATURE: 97.88 F | HEART RATE: 63 BPM | DIASTOLIC BLOOD PRESSURE: 83 MMHG

## 2017-08-10 VITALS — OXYGEN SATURATION: 97 % | HEART RATE: 75 BPM | SYSTOLIC BLOOD PRESSURE: 135 MMHG | DIASTOLIC BLOOD PRESSURE: 91 MMHG

## 2017-08-10 VITALS — DIASTOLIC BLOOD PRESSURE: 84 MMHG | HEART RATE: 55 BPM | SYSTOLIC BLOOD PRESSURE: 135 MMHG | OXYGEN SATURATION: 97 %

## 2017-08-10 VITALS — OXYGEN SATURATION: 98 % | HEART RATE: 58 BPM | DIASTOLIC BLOOD PRESSURE: 82 MMHG | SYSTOLIC BLOOD PRESSURE: 127 MMHG

## 2017-08-10 VITALS
HEART RATE: 56 BPM | OXYGEN SATURATION: 94 % | DIASTOLIC BLOOD PRESSURE: 74 MMHG | TEMPERATURE: 98.42 F | SYSTOLIC BLOOD PRESSURE: 125 MMHG

## 2017-08-10 VITALS
DIASTOLIC BLOOD PRESSURE: 82 MMHG | HEART RATE: 138 BPM | OXYGEN SATURATION: 98 % | TEMPERATURE: 97.7 F | SYSTOLIC BLOOD PRESSURE: 101 MMHG

## 2017-08-10 VITALS
TEMPERATURE: 98.42 F | OXYGEN SATURATION: 96 % | HEART RATE: 64 BPM | DIASTOLIC BLOOD PRESSURE: 83 MMHG | SYSTOLIC BLOOD PRESSURE: 134 MMHG

## 2017-08-10 VITALS — HEART RATE: 56 BPM | DIASTOLIC BLOOD PRESSURE: 76 MMHG | SYSTOLIC BLOOD PRESSURE: 142 MMHG | OXYGEN SATURATION: 97 %

## 2017-08-10 VITALS
OXYGEN SATURATION: 94 % | DIASTOLIC BLOOD PRESSURE: 74 MMHG | SYSTOLIC BLOOD PRESSURE: 125 MMHG | TEMPERATURE: 98.42 F | HEART RATE: 56 BPM

## 2017-08-10 VITALS — HEART RATE: 64 BPM | DIASTOLIC BLOOD PRESSURE: 84 MMHG | OXYGEN SATURATION: 98 % | SYSTOLIC BLOOD PRESSURE: 132 MMHG

## 2017-08-10 LAB
ANION GAP SERPL CALC-SCNC: 4 MMOL/L (ref 3–11)
BUN SERPL-MCNC: 14 MG/DL (ref 7–18)
BUN/CREAT SERPL: 13 (ref 10–20)
CALCIUM SERPL-MCNC: 9.2 MG/DL (ref 8.5–10.1)
CHLORIDE SERPL-SCNC: 110 MMOL/L (ref 98–107)
CO2 SERPL-SCNC: 28 MMOL/L (ref 21–32)
CREAT CL PREDICTED SERPL C-G-VRATE: 108.8 ML/MIN
CREAT SERPL-MCNC: 1.1 MG/DL (ref 0.6–1.4)
EOSINOPHIL NFR BLD AUTO: 263 K/UL (ref 130–400)
GLUCOSE SERPL-MCNC: 110 MG/DL (ref 70–99)
HCT VFR BLD CALC: 46.2 % (ref 42–52)
INR PPP: 2.2 (ref 0.9–1.1)
MAGNESIUM SERPL-MCNC: 2.2 MG/DL (ref 1.8–2.4)
MCH RBC QN AUTO: 30.5 PG (ref 25–34)
MCHC RBC AUTO-ENTMCNC: 34.4 G/DL (ref 32–36)
MCV RBC AUTO: 88.5 FL (ref 80–100)
PMV BLD AUTO: 11 FL (ref 7.4–10.4)
POTASSIUM SERPL-SCNC: 4.2 MMOL/L (ref 3.5–5.1)
PROTHROMBIN TIME: 24.1 SECONDS (ref 9–12)
RBC # BLD AUTO: 5.22 M/UL (ref 4.7–6.1)
SODIUM SERPL-SCNC: 142 MMOL/L (ref 136–145)
WBC # BLD AUTO: 9.79 K/UL (ref 4.8–10.8)

## 2017-08-10 PROCEDURE — 02K83ZZ MAP CONDUCTION MECHANISM, PERCUTANEOUS APPROACH: ICD-10-PCS | Performed by: INTERNAL MEDICINE

## 2017-08-10 PROCEDURE — 02583ZZ DESTRUCTION OF CONDUCTION MECHANISM, PERCUTANEOUS APPROACH: ICD-10-PCS | Performed by: INTERNAL MEDICINE

## 2017-08-10 PROCEDURE — 4A0234Z MEASUREMENT OF CARDIAC ELECTRICAL ACTIVITY, PERCUTANEOUS APPROACH: ICD-10-PCS | Performed by: INTERNAL MEDICINE

## 2017-08-10 RX ADMIN — LACTOBACILLUS TAB SCH TAB: TAB at 12:25

## 2017-08-10 RX ADMIN — LACTOBACILLUS TAB SCH TAB: TAB at 15:44

## 2017-08-10 RX ADMIN — Medication SCH TAB: at 12:24

## 2017-08-10 RX ADMIN — WARFARIN SCH MG: 10 TABLET ORAL at 15:44

## 2017-08-10 RX ADMIN — Medication SCH MG: at 12:23

## 2017-08-10 RX ADMIN — FEBUXOSTAT SCH MG: 40 TABLET ORAL at 12:23

## 2017-08-10 RX ADMIN — LACTOBACILLUS TAB SCH TAB: TAB at 07:30

## 2017-08-10 RX ADMIN — BUPROPION HYDROCHLORIDE SCH MG: 150 TABLET, EXTENDED RELEASE ORAL at 12:23

## 2017-08-10 RX ADMIN — METOPROLOL TARTRATE SCH MG: 50 TABLET, FILM COATED ORAL at 09:00

## 2017-08-10 NOTE — PROCEDURE NOTE
Procedure Note


Date of Service


Aug 10, 2017.





Procedure Note


Procedure performed:  Ablation of ectopic atrial tachycardia, 3 dimensional 

electro anatomical mapping, arrhythmia induction using program stimulation on 

and off isoproterenol, complete electrophysiologic testing including pacing 

from the left atrium via the coronary sinus





Staff cardiologist:


Indication:  The patient is a 47-year-old gentleman with a history of 

mechanical aortic valve replacement.  He presented to [facility] with symptoms of palpitations and tachycardia.  He was noted to have an 

ectopic atrial tachycardia that was nearly incessant.  Medical therapy was not 

effective.  Therefore the patient was brought to the EP lab for 

electrophysiologic testing and possible ablation.





Procedure in detail:


The patient was informed of the risks benefits and alternatives to the intended 

procedure he understood such in which proceed.  He was taken to the EP lab in a 

fasting state.  Conscious sedation was administered per protocol the patient 

was monitored electrocardiographically throughout today's procedure.  The right 

femoral area was prepped and draped in usual sterile fashion. This area was 

anesthetized using subcutaneous administration of xylocaine and Marcaine 

solution.  The right femoral vein was then accessed 3 times using modified 

Selinger technique.  Sheaths were placed over guidewires at this site and used 

to facilitate passage of the EP catheters to their respective chambers under 

fluoroscopic guidance.  This included right ventricular coronary sinus and 

right atrial mapping catheter.  The patient's tachycardia was subsequently 

mapped using three-dimensional electro anatomical mapping.  The baseline 

electrical system was characterized.  Radiofrequency lesions were placed in 

power limited mode in the area of earliest activation.  Lesions were placed 

until the tachycardia terminated.  Subsequent to ablation the patient underwent 

complete electrophysiologic testing including attempted arrhythmia induction.  

This was performed on and off isoproterenol with both burst atrial and 

programmed stimulation.  At the conclusion of the procedure the baseline 

intervals were again measured.  The catheters and sheaths were removed.  

Hemostasis was achieved at the access site using manual pressure.  The patient 

tolerated procedure well there were no immediate complications.





Findings:





Three-dimensional electro anatomical mapping revealed area of earliest 

activation to be along the superior tricuspid annulus towards the septal 

region.  At this location local activation was 50 milliseconds earlier than the 

surface P wave.  High output pacing was performed in order to exclude phrenic 

nerve stimulation at this site prior to ablation.  This site was also localized 

using catheters and fluoroscopy in order to avoid injury to the AV node and his 

bundle as they were in proximity.





Ablation:  Ablation was performed using power limited mode.  Thirty-five martínez 

was delivered with a 4 sensing irrigated tip catheter.  Multiple lesions were 

placed in the area of interest until the tachycardia was no longer inducible.





Arrhythmia induction:  Subsequent to ablation arrhythmia induction was 

performed using aggressive burst stimulation and program stimulation.  Pacing 

was performed on isoproterenol up to 5 micrograms/minute.





Baseline intracardiac intervals:


Subsequent to ablation the following intervals were obtained:


Cycle length in the atrium 966ms


Cycle length in the ventricle 968ms


MD interval 136ms


QRS duration 96ms


QT interval 412ms


Corrected QT interval 419ms


AH interval 78ms


HV interval 38ms





Av Wenckebach occurred at a cycle length of 380ms


VA Wenckebach occurred at a cycle length of 390ms


Retrograde conduction was concentric and decremental in nature





Av node effective refractory period was 280ms


There is no evidence of dual AV betty physiology.  There were no echo beats.








Impression:


1. Successful ablation of right atrial tachycardia


2. Normal intracardiac interval subsequent to ablation


3. No evidence of dual AV betty physiology


4. No evidence of accessory pathway conduction

## 2017-08-10 NOTE — DISCHARGE SUMMARY
Discharge Summary


Date of Service


Aug 10, 2017.





Discharge Summary


Admission Date:


Aug 7, 2017 at 18:16


Discharge Date:  Aug 10, 2017


Discharge Disposition:  Home


Principal Diagnosis:  Atrial tachycardia


Problems/Secondary Diagnoses:


Mechanical AVR on Coumadin


Immunizations:  


   Have You Had Influenza Vaccine:  Yes


   Influenza Vaccine Date:  Oct 6, 2011


   History of Tetanus Vaccine?:  Unknown


   History of Pneumococcal:  No


   History of Hepatitis B Vaccine:  Unknown


Procedures:


Right atrial ablation


Consultations:


Cardiology





Medication Reconciliation


New Medications:  


Metoprolol Tartrate (Lopressor) (Lopressor) 50 Mg Tab


50 MG PO BID, #60 TAB 3 Refills





Warfarin Sod (Coumadin) 10 Mg Tab


10 MG PO DAILY@16, #30 TAB 3 Refills





 


Continued Medications:  


Amoxicillin (Amoxil) 500 Mg Cap


2000 MG PO UD, CAP


TAKE 4 CAPSULES 1 HOURS PRIOR TO DENTAL APPOINTMENT.


Aspirin (Aspirin Ec) 81 Mg Tab


81 MG PO QAM





Bupropion (Wellbutrin Sr) 150 Mg Ertab


150 MG PO BID, 0 Refills





Escitalopram Oxalate (Lexapro) 20 Mg Tab


20 MG PO HS





Febuxostat (Uloric) 40 Mg Tab


40 MG PO QAM, TAB 3 Refills





Lorazepam (Ativan *) 0.5 Mg Tab


0.5 MG PO TID PRN, 0 Refills





Multiple Vitamin (Multivitamin) 1 Tab Tab


1 TAB PO QAM





Probiotic Product (Align) 4 Mg Cap


4 MG PO HS





Ropinirole (Requip) 0.25 Mg Tab


0.5 MG PO HS, TAB





Simvastatin (Zocor) 40 Mg Tab


40 MG PO QPM





Vancomycin Hcl (Vancomycin) 125 Mg Cap


125 MG PO QPM





Zolpidem Tartrate (Ambien) 10 Mg Tab


10 MG PO HS, 0 Refills





 


Discontinued Medications:  


Metoprolol Tartrate (Lopressor) (Lopressor) 100 Mg Tab


150 MG PO QPM, TAB





Warfarin Sod (Jantoven) 5 Mg Tab


5 MG PO DAILY, TAB


TAKE WITH 2MG FOR TOTAL DOSE OF 7MG


Warfarin Sod (Jantoven) 2 Mg Tab


2 MG PO DAILY, TAB


TAKE WITH 5 MG FOR TOTAL DOSE OF 7MG








Discharge Exam


Patient had successful ablation today.  Discussed with Dr. Kocher, okay for 

discharge to home and will see in the office.


Review of Systems:  


   Constitutional:  No fever, No chills, No sweats, No weight loss, No weakness

, No fatigue, No problem reported


   Eyes:  No worsening of vision, No eye pain, No redness, No discharge, No 

diplopia, No problem reported


   ENT:  No hearing loss, No unusual epistaxis, No nasal symptoms, No sore 

throat, No tinnitus, No dental problems, No trouble swallowing, No problem 

reported


   Respiratory:  No cough, No sputum, No wheezing, No shortness of breath, No 

dyspnea on exertion, No dyspnea at rest, No hemoptysis, No problem reported


   Cardiovascular:  No chest pain, No orthopnea, No PND, No edema, No 

claudication, No palpitations, No problem reported


   Abdomen:  No pain, No nausea, No vomiting, No diarrhea, No constipation, No 

GI bleeding, No problem reported


   Musculoskeletal:  No joint pain, No muscle pain, No swelling, No calf pain, 

No problem reported


   Genitourinary - Female:  No dysuria, No urinary frequency, No urinary urgency

, No urinary incontinence, No urinary retention, No hematuria


   Neurologic:  No memory loss, No paralysis, No weakness, No numbness/tingling

, No vertigo, No balance problems, No problem reported


   Psychiatric:  No depression symptoms, No anhedonism, No anxiety, No insomnia

, No substance abuse, No problem reported


   Endocrine:  No fatigue, No excessive thirst, No excessive urination, No 

problem reported


   Hematologic / Lymphatic:  No abnormal bleeding/bruising, No clotting problems

, No swollen lymph nodes, No night sweats, No problem reported


   Integumentary:  No rash, No itch, No new/changing skin lesions, No color 

change, No bleeding, No problem reported


Physical Exam:  


   General Appearance:  WD/WN, no apparent distress


   Eyes:  normal inspection, EOMI, sclerae normal


   ENT:  normal ENT inspection, hearing grossly normal, pharynx normal


   Neck:  supple, no adenopathy, no JVD, trachea midline


   Respiratory/Chest:  chest non-tender, lungs clear, normal breath sounds, no 

respiratory distress, no accessory muscle use


   Cardiovascular:  regular rate, rhythm, no edema, no gallop, no JVD, no murmur

, normal peripheral pulses


   Abdomen / GI:  normal bowel sounds, non tender, soft, no organomegaly


   Extremities:  normal inspection, no calf tenderness, normal capillary refill

, no pedal edema, normal range of motion, pelvis stable


   Neurologic/Psychiatric:  CNs II-XII nml as tested, no motor/sensory deficits

, alert, normal mood/affect, normal reflexes, oriented x 3


   Skin:  normal color, warm/dry, no rash


   Lymphatic:  no adenopathy





Hospital Course


48 y/o male with a history of mechanical AVR in March 2012, HTN, HLD, gout, 

depression and anxiety, RLS, CELINE, and h/o recurrent C. diff who presented to 

the ED on 8/7 with palpitations, dizziness and shortness of breath.  Pt arrived 

with heart rate in the 130s-150s but other vital signs stable.  Pt received 

Lopressor 5 mg IV x 2 doses in ED which did not help to bring rate down.  CXR 

shows no acute disease.  EKG shows ectopic atrial tachycardia.  Labs grossly 

unremarkable.  Pt seen by Dr. Kocher in the ED.





Atrial tachycardia


- did not respond to Metoprolol with addition of Cardizem, Digoxin


- could not use Sotalol due to Lexapro


- ablation on 8/10, tolerated well and in NSR afterwards


- d/c to home on Lopressor 50mg BID, follow up in 10 days with cardiology





Mechanical AVR on chronic anticoagulation


-INR 2.1 on admission, dropped to 1.8 despite 7mg Coumadin


- INR 2.2 after two days of Coumadin 10mg daily


- follow up with coumadin clinic next week





HTN--stable


-Continue Lopressor 50mg BID





HLD


-Continue simvastatin 40 mg PO qd





Gout


-Continue Uloric 40 mg PO qd





Depression and anxiety


-Continue bupropion 150 mg PO BID, escitalopram 20 mg PO qd, and Ativan 0.5 mg 

PO TID prn anxiety





RLS


-Continue Requip 0.5 mg PO qhs





CELINE


-Set up CPAP





H/o recurrent C. 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 

chronic vanc per infectious disease


-Contact precautions


-Continue vancomycin 125 mg PO qd


Total Time Spent:  Less than 30 minutes


This includes examination of the patient, discharge planning, medication 

reconciliation, and communication with other providers.





Discharge Instructions


Please refer to the electronic Patient Visit Report (Discharge Instructions) 

for additional information.





Follow-Up


Cardiology in 10 days


Coumadin clinic next week





Additional Copies To


Kocher, Christopher W., MD; Karen Collazo M.D., PHD; Augusto Claire M.D.

## 2017-08-10 NOTE — DISCHARGE INSTRUCTIONS
Discharge Instructions


Date of Service


Aug 10, 2017.





Admission


Reason for Admission:  Palpitations, Tachycardia





Discharge


Discharge Diagnosis / Problem:  Atrial tachycardia, AVR with subtherapeutic INR





Discharge Goals


Goal(s):  Decrease discomfort, Improve disease control





Activity Recommendations


Activity Limitations:  resume your previous activity


Lifting Limitations:  none


Exercise/Sports Limitations:  as tolerated


May Resume Sexual Activity:  when tolerated


Shower/Bathe:  no limitations


Driving or Machine Use:  no limitations





.





Instructions / Follow-Up


Instructions / Follow-Up


Medications:





- COUMADIN: dose increased to 10mg daily, your INR was actually drifting down 

on 7mg, slowly going up now on 10mg, INR today is 2.2


- LOPRESSOR: take 50mg twice a day





Atrial tachycardia: s/p ablation by Dr. Kocher, now in sinus rhythm


   if you have recurrent symptoms of palpitations call Dr. Kocher's office for 

recommendations, while you were here you were tolerating heart rates in the 120-

150's


   please call his office after discharge for follow up with either Dr. Kocher 

or Dr. Fuentes in 1-2 weeks





Mechanical valve replacement: INR subtherapeutic, today it is 2.2


   please continue 10mg daily of Coumadin and you need to call Coumadin clinic 

for appointment in one week for further INR testing and advise of dosing





Current Hospital Diet


Patient's current hospital diet: Regular Diet





Discharge Diet


Recommended Diet:  Regular Diet





Procedures


Procedures Performed:  


Right atrial ablation





Pending Studies


Studies pending at discharge:  no





Laboratory Results





Lipid Panel








Test


  8/7/17


10:00 Range/Units


 


 


Triglycerides Level 276 H 0-150  mg/dl


 


Cholesterol Level 187  0-200  mg/dl


 


HDL Cholesterol 42   mg/dl


 


Cholesterol/HDL Ratio 4.5   


 


LDL Cholesterol, Calculated 90   mg/dl











Medical Emergencies








.


Who to Call and When:





Medical Emergencies:  If at any time you feel your situation is an emergency, 

please call 911 immediately.





.





Non-Emergent Contact


Non-Emergency issues call your:  Cardiologist


Call Non-Emergent contact if:  you have any medication questions





.


.








"Provider Documentation" section prepared by Anastacio Hung.








.





VTE Core Measure


Inpt VTE Proph given/why not?:  Warfarin (Coumadin), T.E.D. Stockings, SCD's





[place] Drug Monitoring Program


Search Results:  no issues identified

## 2018-02-16 ENCOUNTER — HOSPITAL ENCOUNTER (OUTPATIENT)
Dept: HOSPITAL 45 - C.LAB | Age: 48
Discharge: HOME | End: 2018-02-16
Attending: INTERNAL MEDICINE
Payer: COMMERCIAL

## 2018-02-16 DIAGNOSIS — M10.9: Primary | ICD-10-CM

## 2018-02-16 DIAGNOSIS — R79.82: ICD-10-CM

## 2018-02-16 DIAGNOSIS — M31.0: ICD-10-CM

## 2018-02-16 LAB
ALBUMIN SERPL-MCNC: 3.4 GM/DL (ref 3.4–5)
ALP SERPL-CCNC: 108 U/L (ref 45–117)
ALT SERPL-CCNC: 33 U/L (ref 12–78)
AST SERPL-CCNC: 27 U/L (ref 15–37)
BASOPHILS # BLD: 0.01 K/UL (ref 0–0.2)
BASOPHILS NFR BLD: 0.1 %
CREAT SERPL-MCNC: 1.08 MG/DL (ref 0.6–1.4)
EOS ABS #: 0.19 K/UL (ref 0–0.5)
EOSINOPHIL NFR BLD AUTO: 264 K/UL (ref 130–400)
HCT VFR BLD CALC: 41.8 % (ref 42–52)
HGB BLD-MCNC: 14.3 G/DL (ref 14–18)
IG#: 0.01 K/UL (ref 0–0.02)
IMM GRANULOCYTES NFR BLD AUTO: 22.7 %
LYMPHOCYTES # BLD: 1.95 K/UL (ref 1.2–3.4)
MCH RBC QN AUTO: 29.7 PG (ref 25–34)
MCHC RBC AUTO-ENTMCNC: 34.2 G/DL (ref 32–36)
MCV RBC AUTO: 86.7 FL (ref 80–100)
MONO ABS #: 0.62 K/UL (ref 0.11–0.59)
MONOCYTES NFR BLD: 7.2 %
NEUT ABS #: 5.8 K/UL (ref 1.4–6.5)
NEUTROPHILS # BLD AUTO: 2.2 %
NEUTROPHILS NFR BLD AUTO: 67.7 %
PMV BLD AUTO: 9.8 FL (ref 7.4–10.4)
PROT SERPL-MCNC: 6.9 GM/DL (ref 6.4–8.2)
RED CELL DISTRIBUTION WIDTH CV: 13.1 % (ref 11.5–14.5)
RED CELL DISTRIBUTION WIDTH SD: 42 FL (ref 36.4–46.3)
URATE SERPL-MCNC: 5.1 MG/DL (ref 2.6–7.2)
WBC # BLD AUTO: 8.58 K/UL (ref 4.8–10.8)

## 2018-04-02 ENCOUNTER — HOSPITAL ENCOUNTER (OUTPATIENT)
Dept: HOSPITAL 45 - C.RAD1850 | Age: 48
Discharge: HOME | End: 2018-04-02
Attending: PHYSICIAN ASSISTANT
Payer: COMMERCIAL

## 2018-04-02 DIAGNOSIS — M54.9: Primary | ICD-10-CM

## 2018-04-02 NOTE — DIAGNOSTIC IMAGING REPORT
L-SPINE MIN 4 VIEWS ROUTINE



CLINICAL HISTORY: Acute lower back pain radiating into left lower extremity.    



COMPARISON: None



FINDINGS:  Alignment of the lumbar spine is anatomic. No fracture is present.

There is no suspicious osseous lesion. There is moderate disc space narrowing

with osteophytosis and vacuum disc phenomenon at L5-S1 and to a lesser extent

L4-L5. There is moderate multilevel facet arthrosis.



IMPRESSION: 



1. No acute lumbar spine fracture or subluxation.



2. Moderate multilevel disc space narrowing, osteophytosis, vacuum disc

phenomenon and facet arthrosis within the lower lumbar spine.







Electronically signed by:  Justin Serrano M.D.

4/2/2018 4:17 PM



Dictated Date/Time:  4/2/2018 4:16 PM

## 2018-04-19 ENCOUNTER — HOSPITAL ENCOUNTER (OUTPATIENT)
Dept: HOSPITAL 45 - C.RADBC | Age: 48
Discharge: HOME | End: 2018-04-19
Attending: PHYSICIAN ASSISTANT
Payer: COMMERCIAL

## 2018-04-19 DIAGNOSIS — M21.751: ICD-10-CM

## 2018-04-19 DIAGNOSIS — M46.1: Primary | ICD-10-CM

## 2018-04-19 NOTE — DIAGNOSTIC IMAGING REPORT
LEG LENGTH STUDY (WHOLE LEG)



HISTORY:  48 years-old Male SACROILIITIS,LEG LENGTH DISCREPENCY    



COMPARISON: None available



TECHNIQUE: Single AP view of the bilateral lower extremities



FINDINGS: 

The right lower extremity measured from the femoral head to the tibial plafond

measures 91.5 cm. The left lower extremity measured from the femoral head to the

tibial plafond measures 92.1 cm. Focal area of cortical thickening involving the

lateral mid shaft right femur suggests area of remote trauma. No significant

degenerative changes identified. No acute fracture or dislocation is seen.



IMPRESSION: Left leg measures 0.6 cm longer than the right. 







The above report was generated using voice recognition software. It may contain

grammatical, syntax or spelling errors.







Electronically signed by:  Jackson Cary M.D.

4/19/2018 5:47 PM



Dictated Date/Time:  4/19/2018 5:43 PM

## 2018-08-27 ENCOUNTER — HOSPITAL ENCOUNTER (OUTPATIENT)
Dept: HOSPITAL 45 - C.LAB1850 | Age: 48
Discharge: HOME | End: 2018-08-27
Attending: INTERNAL MEDICINE
Payer: COMMERCIAL

## 2018-08-27 DIAGNOSIS — E78.5: ICD-10-CM

## 2018-08-27 DIAGNOSIS — I10: ICD-10-CM

## 2018-08-27 DIAGNOSIS — Z51.81: Primary | ICD-10-CM

## 2018-08-27 DIAGNOSIS — Z79.899: ICD-10-CM

## 2018-08-27 LAB
ALT SERPL-CCNC: 34 U/L (ref 12–78)
AST SERPL-CCNC: 24 U/L (ref 15–37)
BUN SERPL-MCNC: 21 MG/DL (ref 7–18)
CALCIUM SERPL-MCNC: 9.2 MG/DL (ref 8.5–10.1)
CO2 SERPL-SCNC: 23 MMOL/L (ref 21–32)
CREAT SERPL-MCNC: 1.13 MG/DL (ref 0.6–1.4)
GLUCOSE SERPL-MCNC: 107 MG/DL (ref 70–99)
KETONES UR QL STRIP: 98 MG/DL
PH UR: 180 MG/DL (ref 0–200)
POTASSIUM SERPL-SCNC: 4.2 MMOL/L (ref 3.5–5.1)
SODIUM SERPL-SCNC: 140 MMOL/L (ref 136–145)